# Patient Record
Sex: FEMALE | Race: WHITE | NOT HISPANIC OR LATINO | Employment: FULL TIME | URBAN - METROPOLITAN AREA
[De-identification: names, ages, dates, MRNs, and addresses within clinical notes are randomized per-mention and may not be internally consistent; named-entity substitution may affect disease eponyms.]

---

## 2023-04-07 DIAGNOSIS — G47.00 INSOMNIA, UNSPECIFIED TYPE: Primary | ICD-10-CM

## 2023-04-07 RX ORDER — HYDROXYZINE HYDROCHLORIDE 25 MG/1
TABLET, FILM COATED ORAL
Qty: 90 TABLET | Refills: 0 | Status: SHIPPED | OUTPATIENT
Start: 2023-04-07 | End: 2023-05-05 | Stop reason: SDUPTHER

## 2023-05-04 ASSESSMENT — ENCOUNTER SYMPTOMS
SHORTNESS OF BREATH: 0
ABDOMINAL PAIN: 0

## 2023-05-05 ENCOUNTER — OFFICE VISIT (OUTPATIENT)
Dept: PRIMARY CARE | Facility: CLINIC | Age: 43
End: 2023-05-05
Payer: COMMERCIAL

## 2023-05-05 VITALS
DIASTOLIC BLOOD PRESSURE: 82 MMHG | OXYGEN SATURATION: 98 % | TEMPERATURE: 97.1 F | SYSTOLIC BLOOD PRESSURE: 122 MMHG | BODY MASS INDEX: 27.14 KG/M2 | HEART RATE: 76 BPM | WEIGHT: 159 LBS | HEIGHT: 64 IN

## 2023-05-05 DIAGNOSIS — R61 GENERALIZED HYPERHIDROSIS: ICD-10-CM

## 2023-05-05 DIAGNOSIS — F32.0 MILD MAJOR DEPRESSION (CMS-HCC): ICD-10-CM

## 2023-05-05 DIAGNOSIS — F41.1 GAD (GENERALIZED ANXIETY DISORDER): Primary | ICD-10-CM

## 2023-05-05 DIAGNOSIS — K58.0 IRRITABLE BOWEL SYNDROME WITH DIARRHEA: ICD-10-CM

## 2023-05-05 DIAGNOSIS — G47.00 INSOMNIA, UNSPECIFIED TYPE: ICD-10-CM

## 2023-05-05 PROCEDURE — 99214 OFFICE O/P EST MOD 30 MIN: CPT | Performed by: PHYSICIAN ASSISTANT

## 2023-05-05 RX ORDER — ALUMINUM CHLORIDE 20 %
SOLUTION, NON-ORAL TOPICAL
COMMUNITY
Start: 2016-01-15 | End: 2023-05-05 | Stop reason: SDUPTHER

## 2023-05-05 RX ORDER — BUPROPION HYDROCHLORIDE 150 MG/1
150 TABLET ORAL EVERY MORNING
Qty: 90 TABLET | Refills: 1 | Status: SHIPPED | OUTPATIENT
Start: 2023-05-05 | End: 2023-10-05 | Stop reason: DRUGHIGH

## 2023-05-05 RX ORDER — LEVONORGESTREL 52 MG/1
INTRAUTERINE DEVICE INTRAUTERINE
COMMUNITY

## 2023-05-05 RX ORDER — HYDROXYZINE HYDROCHLORIDE 25 MG/1
TABLET, FILM COATED ORAL
Qty: 90 TABLET | Refills: 1 | Status: SHIPPED | OUTPATIENT
Start: 2023-05-05 | End: 2023-10-05 | Stop reason: SDUPTHER

## 2023-05-05 RX ORDER — DICYCLOMINE HYDROCHLORIDE 20 MG/1
TABLET ORAL
COMMUNITY
Start: 2021-11-08 | End: 2023-10-05 | Stop reason: SDUPTHER

## 2023-05-05 RX ORDER — CITALOPRAM 20 MG/1
20 TABLET, FILM COATED ORAL DAILY
Qty: 90 TABLET | Refills: 1 | Status: SHIPPED | OUTPATIENT
Start: 2023-05-05 | End: 2023-06-28 | Stop reason: SDUPTHER

## 2023-05-05 RX ORDER — HYDROXYCHLOROQUINE SULFATE 200 MG/1
TABLET, FILM COATED ORAL EVERY 24 HOURS
COMMUNITY

## 2023-05-05 RX ORDER — ALUMINUM CHLORIDE 20 %
SOLUTION, NON-ORAL TOPICAL
Qty: 37.5 ML | Refills: 1 | Status: SHIPPED | OUTPATIENT
Start: 2023-05-05 | End: 2023-10-05 | Stop reason: SDUPTHER

## 2023-05-05 RX ORDER — ALBUTEROL SULFATE 90 UG/1
AEROSOL, METERED RESPIRATORY (INHALATION)
COMMUNITY
Start: 2020-06-02 | End: 2023-10-05 | Stop reason: WASHOUT

## 2023-05-05 RX ORDER — CITALOPRAM 40 MG/1
TABLET, FILM COATED ORAL
COMMUNITY
Start: 2020-10-26 | End: 2023-05-05 | Stop reason: DRUGHIGH

## 2023-05-05 NOTE — PROGRESS NOTES
"Subjective   Patient ID: Tara L Berardinelli is a 42 y.o. female who presents for Anxiety, Depression, and Insomnia (Ibs recheck ).    HPI   Patient presents for recheck of anxiety/depression, IBS, and hyperhidrosis.  Anxiety and depression: Fairly well controlled with Citalopram 40mg, which she tolerates well. Mood seems ok. Mind racing controlled fairly well. No panic attacks. No suicidal ideation.   New job is going well.  However she is noticing that she is having some attention issues that are more noticeable in this job.  Has always struggled with attention deficit however was able to work through it however it seems like its been more noticeable over this past 6 months.  Hard time focusing and staying on task.  Gets distracted easily.  Others have mentioned to her that she seems to be having attention difficulties.  Sometimes lacking motivation.    Insomnia: Needing to use hydroxyzine 25mg qhs most nights to help sleep. It helps though.      Doing regular exercise but more with wt lifting than cardio.     IBS: Still doing well. Only needs to take Bentyl rarely. Using probiotics.   No abdominal pain.     Hyperhidrosis: Well controlled with Drysol that she uses rarely (about once per month). Tolerates med well.      Sees rheumatologist for chronic intermittent arthralgias and Sjogren's still. Continues to do well with plaquenil.      Got COVID vaccine.     Former smoker.     Review of Systems   Respiratory:  Negative for shortness of breath.    Cardiovascular:  Negative for chest pain.   Gastrointestinal:  Negative for abdominal pain.       Objective   /82   Pulse 76   Temp 36.2 °C (97.1 °F)   Ht 1.626 m (5' 4\")   Wt 72.1 kg (159 lb)   SpO2 98%   BMI 27.29 kg/m²     Physical Exam  Vitals and nursing note reviewed.   Constitutional:       Appearance: Normal appearance. She is well-developed.   Eyes:      General: No scleral icterus.  Cardiovascular:      Rate and Rhythm: Normal rate and regular " rhythm.      Heart sounds: No murmur heard.  Pulmonary:      Effort: Pulmonary effort is normal.      Breath sounds: Normal breath sounds.   Abdominal:      Palpations: Abdomen is soft. There is no mass.      Tenderness: There is no abdominal tenderness.   Musculoskeletal:      Cervical back: Neck supple.   Skin:     General: Skin is warm and dry.   Neurological:      Mental Status: She is alert.         Assessment/Plan   Diagnoses and all orders for this visit:  EVANGELINA (generalized anxiety disorder)  -     citalopram (CeleXA) 20 mg tablet; Take 1 tablet (20 mg) by mouth once daily.  -     buPROPion XL (Wellbutrin XL) 150 mg 24 hr tablet; Take 1 tablet (150 mg) by mouth once daily in the morning.  Mild major depression (CMS/HCC)  -     citalopram (CeleXA) 20 mg tablet; Take 1 tablet (20 mg) by mouth once daily.  -     buPROPion XL (Wellbutrin XL) 150 mg 24 hr tablet; Take 1 tablet (150 mg) by mouth once daily in the morning.  Insomnia, unspecified type  -     hydrOXYzine HCL (Atarax) 25 mg tablet; TAKE 1 TABLET BY MOUTH AT BEDTIME AS NEEDED  Irritable bowel syndrome with diarrhea  Generalized hyperhidrosis  -     aluminum chloride (Drysol Dab-O-Matic) 20 % external solution; apply 2x/wk at bedtime as needed       Counseled patient.  Discussed treatment options for attention difficulties, particularly nonstimulant routes.  Discussed studies with Wellbutrin by nursing good results and patient is interested in trying this.  Reduce citalopram to 20 mg daily and add Rx bupropion  mg 1 p.o. every morning.  Refilled meds.   Discussed diet and exercise and encouraged weight loss.  Add more cardio exercise.  Follow up in 5 months for recheck of anxiety, depression, insomnia, IBS, hyperhidrosis, earlier if needed.

## 2023-06-27 ENCOUNTER — TELEPHONE (OUTPATIENT)
Dept: PRIMARY CARE | Facility: CLINIC | Age: 43
End: 2023-06-27
Payer: COMMERCIAL

## 2023-06-27 DIAGNOSIS — F32.0 MILD MAJOR DEPRESSION (CMS-HCC): ICD-10-CM

## 2023-06-27 DIAGNOSIS — F41.1 GAD (GENERALIZED ANXIETY DISORDER): ICD-10-CM

## 2023-06-27 NOTE — TELEPHONE ENCOUNTER
Pt called rx line at 226p stating that the changes in meds at last ov on 5/2023 is not working, reducing citalopram and starting wellbutrin is not working. She feels short fused and more anxious. Please advise, OV??

## 2023-06-28 RX ORDER — CITALOPRAM 20 MG/1
30 TABLET, FILM COATED ORAL DAILY
Qty: 135 TABLET | Refills: 0 | Status: SHIPPED | OUTPATIENT
Start: 2023-06-28 | End: 2023-10-05 | Stop reason: SDUPTHER

## 2023-10-05 ENCOUNTER — OFFICE VISIT (OUTPATIENT)
Dept: PRIMARY CARE | Facility: CLINIC | Age: 43
End: 2023-10-05
Payer: COMMERCIAL

## 2023-10-05 VITALS
TEMPERATURE: 97.4 F | BODY MASS INDEX: 27.12 KG/M2 | HEART RATE: 68 BPM | WEIGHT: 158 LBS | DIASTOLIC BLOOD PRESSURE: 70 MMHG | SYSTOLIC BLOOD PRESSURE: 116 MMHG

## 2023-10-05 DIAGNOSIS — R61 GENERALIZED HYPERHIDROSIS: ICD-10-CM

## 2023-10-05 DIAGNOSIS — K58.0 IRRITABLE BOWEL SYNDROME WITH DIARRHEA: ICD-10-CM

## 2023-10-05 DIAGNOSIS — F41.1 GAD (GENERALIZED ANXIETY DISORDER): ICD-10-CM

## 2023-10-05 DIAGNOSIS — G47.00 INSOMNIA, UNSPECIFIED TYPE: ICD-10-CM

## 2023-10-05 DIAGNOSIS — F32.0 MILD MAJOR DEPRESSION (CMS-HCC): Primary | ICD-10-CM

## 2023-10-05 PROBLEM — F32.9 MAJOR DEPRESSIVE DISORDER: Status: ACTIVE | Noted: 2017-01-01

## 2023-10-05 PROBLEM — M35.00 SJOGRENS SYNDROME (MULTI): Status: ACTIVE | Noted: 2023-10-05

## 2023-10-05 PROCEDURE — 99214 OFFICE O/P EST MOD 30 MIN: CPT | Performed by: PHYSICIAN ASSISTANT

## 2023-10-05 PROCEDURE — 1036F TOBACCO NON-USER: CPT | Performed by: PHYSICIAN ASSISTANT

## 2023-10-05 RX ORDER — BUPROPION HYDROCHLORIDE 300 MG/1
300 TABLET ORAL EVERY MORNING
Qty: 90 TABLET | Refills: 1 | Status: SHIPPED | OUTPATIENT
Start: 2023-10-05 | End: 2024-02-27 | Stop reason: SDUPTHER

## 2023-10-05 RX ORDER — CITALOPRAM 20 MG/1
30 TABLET, FILM COATED ORAL DAILY
Qty: 135 TABLET | Refills: 0 | Status: SHIPPED | OUTPATIENT
Start: 2023-10-05 | End: 2024-01-17

## 2023-10-05 RX ORDER — DICYCLOMINE HYDROCHLORIDE 20 MG/1
20 TABLET ORAL 2 TIMES DAILY PRN
Qty: 60 TABLET | Refills: 0 | Status: SHIPPED | OUTPATIENT
Start: 2023-10-05 | End: 2024-02-27 | Stop reason: SDUPTHER

## 2023-10-05 RX ORDER — BUPROPION HYDROCHLORIDE 150 MG/1
150 TABLET ORAL EVERY MORNING
Qty: 90 TABLET | Refills: 1 | Status: CANCELLED | OUTPATIENT
Start: 2023-10-05

## 2023-10-05 RX ORDER — ALBUTEROL SULFATE 90 UG/1
AEROSOL, METERED RESPIRATORY (INHALATION)
Qty: 18 G | Status: CANCELLED | OUTPATIENT
Start: 2023-10-05

## 2023-10-05 RX ORDER — HYDROXYCHLOROQUINE SULFATE 200 MG/1
TABLET, FILM COATED ORAL EVERY 24 HOURS
Status: CANCELLED | OUTPATIENT
Start: 2023-10-05

## 2023-10-05 RX ORDER — ALUMINUM CHLORIDE 20 %
SOLUTION, NON-ORAL TOPICAL
Qty: 37.5 ML | Refills: 1 | Status: SHIPPED | OUTPATIENT
Start: 2023-10-05

## 2023-10-05 RX ORDER — HYDROXYZINE HYDROCHLORIDE 25 MG/1
TABLET, FILM COATED ORAL
Qty: 90 TABLET | Refills: 1 | Status: SHIPPED | OUTPATIENT
Start: 2023-10-05 | End: 2024-02-27 | Stop reason: SDUPTHER

## 2023-10-05 RX ORDER — OXYBUTYNIN CHLORIDE 10 MG/1
10 TABLET, EXTENDED RELEASE ORAL DAILY
COMMUNITY
Start: 2023-08-18 | End: 2023-10-05 | Stop reason: WASHOUT

## 2023-10-05 ASSESSMENT — ENCOUNTER SYMPTOMS
SHORTNESS OF BREATH: 0
ABDOMINAL PAIN: 0

## 2023-10-05 NOTE — PROGRESS NOTES
Subjective   Patient ID: Tara L Berardinelli is a 42 y.o. female who presents for Anxiety, Depression, and Insomnia.    HPI   Patient presents for recheck of anxiety/depression, IBS, and hyperhidrosis.    Anxiety and depression: Reduced citalopram to 20mg after last visit and added Wellbutrin XL 150mg qAM to see if it would help attention issues and motivation. Felt more anxious and irritable, so increased citalopram to 30mg instead.  Still getting a lot of anxiety and still a little down and difficulty focusing. Interested in increasing Wellbutrin dose.     Insomnia: Needing to use hydroxyzine 25mg qhs most nights to help sleep.      Doing regular exercise--getting more cardio, doing Sony.     IBS: Still doing well. Only needs to take Bentyl rarely. Using probiotics.  No abdominal pain.     Hyperhidrosis: Well controlled with Drysol that she uses rarely (about once per month). Tolerates med well.      Sees rheumatologist for chronic intermittent arthralgias and Sjogren's. Continues to do well with Plaquenil.       reports that she quit smoking about 20 years ago. Her smoking use included cigarettes. She smoked an average of .25 packs per day. She has never used smokeless tobacco.    Review of Systems   Respiratory:  Negative for shortness of breath.    Cardiovascular:  Negative for chest pain.   Gastrointestinal:  Negative for abdominal pain.       Objective   /70   Pulse 68   Temp 36.3 °C (97.4 °F)   Wt 71.7 kg (158 lb)   BMI 27.12 kg/m²     Physical Exam  Vitals and nursing note reviewed.   Constitutional:       Appearance: Normal appearance. She is well-developed.   Eyes:      General: No scleral icterus.  Cardiovascular:      Rate and Rhythm: Normal rate and regular rhythm.      Heart sounds: No murmur heard.  Pulmonary:      Effort: Pulmonary effort is normal.      Breath sounds: Normal breath sounds.   Abdominal:      Palpations: Abdomen is soft. There is no mass.      Tenderness: There is no  abdominal tenderness.   Musculoskeletal:      Cervical back: Neck supple.   Skin:     General: Skin is warm and dry.   Neurological:      Mental Status: She is alert.       Assessment/Plan   Diagnoses and all orders for this visit:  Mild major depression (CMS/HCC)  -     citalopram (CeleXA) 20 mg tablet; Take 1.5 tablets (30 mg) by mouth once daily.  -     buPROPion XL (Wellbutrin XL) 300 mg 24 hr tablet; Take 1 tablet (300 mg) by mouth once daily in the morning. Do not crush, chew, or split.  EVANGELINA (generalized anxiety disorder)  -     citalopram (CeleXA) 20 mg tablet; Take 1.5 tablets (30 mg) by mouth once daily.  -     buPROPion XL (Wellbutrin XL) 300 mg 24 hr tablet; Take 1 tablet (300 mg) by mouth once daily in the morning. Do not crush, chew, or split.  Insomnia, unspecified type  -     hydrOXYzine HCL (Atarax) 25 mg tablet; TAKE 1 TABLET BY MOUTH AT BEDTIME AS NEEDED  Irritable bowel syndrome with diarrhea  -     dicyclomine (Bentyl) 20 mg tablet; Take 1 tablet (20 mg) by mouth 2 times a day as needed (IBS symptoms).  Generalized hyperhidrosis  -     aluminum chloride (Drysol Dab-O-Matic) 20 % external solution; apply 2x/wk at bedtime as needed       Counseled patient.    Increase to rx Wellbutrin XL 300mg every day.   Continue Citalopram 20mg 1 1/2 pill per day.   Refilled meds.   Discussed diet and exercise and encouraged weight loss.    Follow up in 5 months for recheck of anxiety, depression, insomnia, IBS, hyperhidrosis, earlier if needed.

## 2024-01-17 DIAGNOSIS — F41.1 GAD (GENERALIZED ANXIETY DISORDER): ICD-10-CM

## 2024-01-17 DIAGNOSIS — F32.0 MILD MAJOR DEPRESSION (CMS-HCC): ICD-10-CM

## 2024-01-17 RX ORDER — CITALOPRAM 20 MG/1
30 TABLET, FILM COATED ORAL DAILY
Qty: 135 TABLET | Refills: 0 | Status: SHIPPED | OUTPATIENT
Start: 2024-01-17 | End: 2024-02-27 | Stop reason: SDUPTHER

## 2024-02-27 ENCOUNTER — OFFICE VISIT (OUTPATIENT)
Dept: PRIMARY CARE | Facility: CLINIC | Age: 44
End: 2024-02-27
Payer: COMMERCIAL

## 2024-02-27 VITALS
WEIGHT: 153 LBS | DIASTOLIC BLOOD PRESSURE: 80 MMHG | HEART RATE: 64 BPM | TEMPERATURE: 97.7 F | SYSTOLIC BLOOD PRESSURE: 122 MMHG | BODY MASS INDEX: 26.26 KG/M2

## 2024-02-27 DIAGNOSIS — F41.1 GAD (GENERALIZED ANXIETY DISORDER): ICD-10-CM

## 2024-02-27 DIAGNOSIS — Z00.00 ROUTINE GENERAL MEDICAL EXAMINATION AT A HEALTH CARE FACILITY: ICD-10-CM

## 2024-02-27 DIAGNOSIS — F32.0 MILD MAJOR DEPRESSION (CMS-HCC): Primary | ICD-10-CM

## 2024-02-27 DIAGNOSIS — K58.0 IRRITABLE BOWEL SYNDROME WITH DIARRHEA: ICD-10-CM

## 2024-02-27 DIAGNOSIS — G47.00 INSOMNIA, UNSPECIFIED TYPE: ICD-10-CM

## 2024-02-27 DIAGNOSIS — R61 GENERALIZED HYPERHIDROSIS: ICD-10-CM

## 2024-02-27 PROCEDURE — 99214 OFFICE O/P EST MOD 30 MIN: CPT | Performed by: PHYSICIAN ASSISTANT

## 2024-02-27 PROCEDURE — 1036F TOBACCO NON-USER: CPT | Performed by: PHYSICIAN ASSISTANT

## 2024-02-27 RX ORDER — CITALOPRAM 20 MG/1
30 TABLET, FILM COATED ORAL DAILY
Qty: 135 TABLET | Refills: 0 | Status: SHIPPED | OUTPATIENT
Start: 2024-02-27 | End: 2024-02-28 | Stop reason: SDUPTHER

## 2024-02-27 RX ORDER — DICYCLOMINE HYDROCHLORIDE 20 MG/1
20 TABLET ORAL 2 TIMES DAILY PRN
Qty: 60 TABLET | Refills: 0 | Status: SHIPPED | OUTPATIENT
Start: 2024-02-27 | End: 2024-02-28 | Stop reason: SDUPTHER

## 2024-02-27 RX ORDER — BUPROPION HYDROCHLORIDE 300 MG/1
300 TABLET ORAL EVERY MORNING
Qty: 90 TABLET | Refills: 1 | Status: SHIPPED | OUTPATIENT
Start: 2024-02-27 | End: 2024-08-25

## 2024-02-27 RX ORDER — HYDROXYZINE HYDROCHLORIDE 25 MG/1
TABLET, FILM COATED ORAL
Qty: 90 TABLET | Refills: 1 | Status: SHIPPED | OUTPATIENT
Start: 2024-02-27 | End: 2024-02-28 | Stop reason: SDUPTHER

## 2024-02-27 ASSESSMENT — ENCOUNTER SYMPTOMS
ABDOMINAL PAIN: 0
SHORTNESS OF BREATH: 0

## 2024-02-27 NOTE — PROGRESS NOTES
Subjective   Patient ID: Tara L Berardinelli is a 43 y.o. female who presents for Anxiety.    HPI   Patient presents for recheck of anxiety/depression, IBS, and hyperhidrosis.    Anxiety and depression: Taking and tolerating Wellbutrin XL 300mg and Citalopram 30mg every day. It is helping anxiety and irritability but also helping her focus and helping mood.     Insomnia: Needing to use hydroxyzine 25mg qhs most nights to help sleep.      Doing regular exercise--getting cardio, doing Sony 3-5 days per week.     IBS: Still doing fairly well. Only needs to take Bentyl rarely.  No abdominal pain.     Hyperhidrosis: Well controlled with Drysol that she uses rarely (about once per month). Tolerates med well.      Sees rheumatologist Dr Swenson for chronic intermittent arthralgias and Sjogren's. Continues to do well with Plaquenil.  Was there 1/3/24.  No changes.     reports that she quit smoking about 21 years ago. Her smoking use included cigarettes. She smoked an average of .25 packs per day. She has never used smokeless tobacco.    Review of Systems   Respiratory:  Negative for shortness of breath.    Cardiovascular:  Negative for chest pain.   Gastrointestinal:  Negative for abdominal pain.       Objective   /80   Pulse 64   Temp 36.5 °C (97.7 °F)   Wt 69.4 kg (153 lb)   BMI 26.26 kg/m²     Physical Exam  Vitals and nursing note reviewed.   Constitutional:       Appearance: Normal appearance. She is well-developed.   Eyes:      General: No scleral icterus.  Cardiovascular:      Rate and Rhythm: Normal rate and regular rhythm.      Heart sounds: No murmur heard.  Pulmonary:      Effort: Pulmonary effort is normal.      Breath sounds: Normal breath sounds.   Abdominal:      Palpations: Abdomen is soft. There is no mass.      Tenderness: There is no abdominal tenderness.   Musculoskeletal:      Cervical back: Neck supple.   Skin:     General: Skin is warm and dry.   Neurological:      Mental Status: She is  alert.       Assessment/Plan   Diagnoses and all orders for this visit:  Mild major depression (CMS/HCC)  -     buPROPion XL (Wellbutrin XL) 300 mg 24 hr tablet; Take 1 tablet (300 mg) by mouth once daily in the morning. Do not crush, chew, or split.  -     citalopram (CeleXA) 20 mg tablet; Take 1.5 tablets (30 mg) by mouth once daily.  EVANGELINA (generalized anxiety disorder)  -     buPROPion XL (Wellbutrin XL) 300 mg 24 hr tablet; Take 1 tablet (300 mg) by mouth once daily in the morning. Do not crush, chew, or split.  -     citalopram (CeleXA) 20 mg tablet; Take 1.5 tablets (30 mg) by mouth once daily.  Insomnia, unspecified type  -     hydrOXYzine HCL (Atarax) 25 mg tablet; TAKE 1 TABLET BY MOUTH AT BEDTIME AS NEEDED  Irritable bowel syndrome with diarrhea  -     dicyclomine (Bentyl) 20 mg tablet; Take 1 tablet (20 mg) by mouth 2 times a day as needed (IBS symptoms).  Generalized hyperhidrosis  Routine general medical examination at a health care facility  -     Lipid Panel; Future  -     Basic Metabolic Panel; Future       Counseled patient.    Refilled meds.   Discussed diet and exercise and encouraged weight loss.    Follow up in 5 months for recheck of anxiety, depression, insomnia, IBS, hyperhidrosis, with fasting labs the week before.

## 2024-02-28 DIAGNOSIS — F41.1 GAD (GENERALIZED ANXIETY DISORDER): ICD-10-CM

## 2024-02-28 DIAGNOSIS — F32.0 MILD MAJOR DEPRESSION (CMS-HCC): ICD-10-CM

## 2024-02-28 DIAGNOSIS — K58.0 IRRITABLE BOWEL SYNDROME WITH DIARRHEA: ICD-10-CM

## 2024-02-28 DIAGNOSIS — G47.00 INSOMNIA, UNSPECIFIED TYPE: ICD-10-CM

## 2024-02-28 RX ORDER — DICYCLOMINE HYDROCHLORIDE 20 MG/1
20 TABLET ORAL 2 TIMES DAILY PRN
Qty: 60 TABLET | Refills: 0 | Status: SHIPPED | OUTPATIENT
Start: 2024-02-28

## 2024-02-28 RX ORDER — HYDROXYZINE HYDROCHLORIDE 25 MG/1
TABLET, FILM COATED ORAL
Qty: 90 TABLET | Refills: 1 | Status: SHIPPED | OUTPATIENT
Start: 2024-02-28

## 2024-02-28 RX ORDER — HYDROXYZINE HYDROCHLORIDE 25 MG/1
TABLET, FILM COATED ORAL
Qty: 90 TABLET | Refills: 1 | Status: SHIPPED | OUTPATIENT
Start: 2024-02-28 | End: 2024-02-28 | Stop reason: SDUPTHER

## 2024-02-28 RX ORDER — CITALOPRAM 20 MG/1
30 TABLET, FILM COATED ORAL DAILY
Qty: 135 TABLET | Refills: 1 | Status: SHIPPED | OUTPATIENT
Start: 2024-02-28 | End: 2024-02-28 | Stop reason: SDUPTHER

## 2024-02-28 RX ORDER — CITALOPRAM 20 MG/1
30 TABLET, FILM COATED ORAL DAILY
Qty: 135 TABLET | Refills: 1 | Status: SHIPPED | OUTPATIENT
Start: 2024-02-28

## 2024-06-26 DIAGNOSIS — R61 GENERALIZED HYPERHIDROSIS: ICD-10-CM

## 2024-06-27 RX ORDER — ALUMINUM CHLORIDE 20 %
SOLUTION, NON-ORAL TOPICAL
Qty: 37.5 ML | Refills: 0 | OUTPATIENT
Start: 2024-06-27

## 2024-07-01 DIAGNOSIS — R61 GENERALIZED HYPERHIDROSIS: ICD-10-CM

## 2024-07-08 RX ORDER — ALUMINUM CHLORIDE 20 %
SOLUTION, NON-ORAL TOPICAL
Qty: 37.5 ML | Refills: 1 | Status: SHIPPED | OUTPATIENT
Start: 2024-07-08

## 2024-07-30 ENCOUNTER — APPOINTMENT (OUTPATIENT)
Dept: PRIMARY CARE | Facility: CLINIC | Age: 44
End: 2024-07-30
Payer: COMMERCIAL

## 2024-07-30 VITALS
TEMPERATURE: 97.6 F | BODY MASS INDEX: 26.95 KG/M2 | SYSTOLIC BLOOD PRESSURE: 122 MMHG | HEART RATE: 87 BPM | OXYGEN SATURATION: 100 % | DIASTOLIC BLOOD PRESSURE: 80 MMHG | WEIGHT: 157 LBS

## 2024-07-30 DIAGNOSIS — G47.00 INSOMNIA, UNSPECIFIED TYPE: ICD-10-CM

## 2024-07-30 DIAGNOSIS — R61 GENERALIZED HYPERHIDROSIS: ICD-10-CM

## 2024-07-30 DIAGNOSIS — F32.0 MILD MAJOR DEPRESSION (CMS-HCC): Primary | ICD-10-CM

## 2024-07-30 DIAGNOSIS — K43.9 VENTRAL HERNIA WITHOUT OBSTRUCTION OR GANGRENE: ICD-10-CM

## 2024-07-30 DIAGNOSIS — F41.1 GAD (GENERALIZED ANXIETY DISORDER): ICD-10-CM

## 2024-07-30 DIAGNOSIS — K58.0 IRRITABLE BOWEL SYNDROME WITH DIARRHEA: ICD-10-CM

## 2024-07-30 PROCEDURE — 99214 OFFICE O/P EST MOD 30 MIN: CPT | Performed by: PHYSICIAN ASSISTANT

## 2024-07-30 RX ORDER — HYDROXYZINE HYDROCHLORIDE 25 MG/1
TABLET, FILM COATED ORAL
Qty: 90 TABLET | Refills: 1 | Status: SHIPPED | OUTPATIENT
Start: 2024-07-30

## 2024-07-30 RX ORDER — BUPROPION HYDROCHLORIDE 300 MG/1
300 TABLET ORAL EVERY MORNING
Qty: 90 TABLET | Refills: 1 | Status: SHIPPED | OUTPATIENT
Start: 2024-07-30 | End: 2025-01-26

## 2024-07-30 RX ORDER — CITALOPRAM 20 MG/1
30 TABLET, FILM COATED ORAL DAILY
Qty: 135 TABLET | Refills: 1 | Status: SHIPPED | OUTPATIENT
Start: 2024-07-30

## 2024-07-30 ASSESSMENT — ENCOUNTER SYMPTOMS
SHORTNESS OF BREATH: 0
ABDOMINAL PAIN: 0

## 2024-07-30 ASSESSMENT — PATIENT HEALTH QUESTIONNAIRE - PHQ9
2. FEELING DOWN, DEPRESSED OR HOPELESS: NOT AT ALL
SUM OF ALL RESPONSES TO PHQ9 QUESTIONS 1 AND 2: 0
1. LITTLE INTEREST OR PLEASURE IN DOING THINGS: NOT AT ALL

## 2024-07-30 NOTE — PROGRESS NOTES
Subjective   Patient ID: Tara L Berardinelli is a 43 y.o. female who presents for Anxiety and Depression.    HPI   Patient presents for recheck of anxiety/depression, IBS, and hyperhidrosis.    Anxiety and depression: Taking and tolerating Wellbutrin XL 300mg and Citalopram 30mg every day. Controlling anxiety and mood is good.      Insomnia: Needing to use hydroxyzine 25mg qhs most nights to help sleep.      Doing regular exercise--doing walking exercise -- not been doing as much Sony lately.   Getting left abdominal pain in area of her hernia scar the past few months. Started after doing wt lifting again.  Hurts when she lifts something heavy.  Had hernia repair in 2018 with Dr Baltazar.     IBS: Still doing fairly well. Only needs to take Bentyl rarely.  No abdominal pain.     Hyperhidrosis: Well controlled with Drysol that she uses rarely (about once per month). Tolerates med well.      Sees rheumatologist Dr Swenson for chronic intermittent arthralgias and Sjogren's. Continues to do well with Plaquenil.  Was there 1/3/24.  No changes.    Did not get fasting labs done for this visit.     reports that she quit smoking about 21 years ago. Her smoking use included cigarettes. She has never used smokeless tobacco.    Review of Systems   Respiratory:  Negative for shortness of breath.    Cardiovascular:  Negative for chest pain.   Gastrointestinal:  Negative for abdominal pain.       Objective   /80   Pulse 87   Temp 36.4 °C (97.6 °F)   Wt 71.2 kg (157 lb)   SpO2 100%   BMI 26.95 kg/m²     Physical Exam  Vitals and nursing note reviewed.   Constitutional:       Appearance: Normal appearance. She is well-developed.   Eyes:      General: No scleral icterus.  Cardiovascular:      Rate and Rhythm: Normal rate and regular rhythm.      Heart sounds: No murmur heard.  Pulmonary:      Effort: Pulmonary effort is normal.      Breath sounds: Normal breath sounds.   Abdominal:      Palpations: Abdomen is soft. There is  no mass.      Comments: There is some tenderness superior to umbilicus and slightly to the left of the midline. This is area is just medial to the small scar from her previous hernia surgery.  Feels like there is some weakness of muscle wall in this area with possible hernia. Has some mild protrusion with valsalva.    Musculoskeletal:      Cervical back: Neck supple.   Skin:     General: Skin is warm and dry.   Neurological:      Mental Status: She is alert.       Assessment/Plan   Diagnoses and all orders for this visit:  Mild major depression (CMS-HCC)  -     buPROPion XL (Wellbutrin XL) 300 mg 24 hr tablet; Take 1 tablet (300 mg) by mouth once daily in the morning. Do not crush, chew, or split.  -     citalopram (CeleXA) 20 mg tablet; Take 1.5 tablets (30 mg) by mouth once daily.  EVANGELINA (generalized anxiety disorder)  -     buPROPion XL (Wellbutrin XL) 300 mg 24 hr tablet; Take 1 tablet (300 mg) by mouth once daily in the morning. Do not crush, chew, or split.  -     citalopram (CeleXA) 20 mg tablet; Take 1.5 tablets (30 mg) by mouth once daily.  Insomnia, unspecified type  -     hydrOXYzine HCL (Atarax) 25 mg tablet; TAKE 1 TABLET BY MOUTH AT BEDTIME AS NEEDED  Irritable bowel syndrome with diarrhea  Generalized hyperhidrosis  Ventral hernia without obstruction or gangrene  -     Referral to General Surgery; Future         Counseled patient.    Refilled meds.   Referred to general surgeon for opinion regarding possible hernia.   Discussed diet and exercise and encouraged weight loss.    Follow up in 5 months for recheck of anxiety, depression, insomnia, IBS, hyperhidrosis, with fasting labs the week before.

## 2024-08-14 ENCOUNTER — APPOINTMENT (OUTPATIENT)
Dept: OBSTETRICS AND GYNECOLOGY | Facility: CLINIC | Age: 44
End: 2024-08-14
Payer: COMMERCIAL

## 2024-08-15 ENCOUNTER — APPOINTMENT (OUTPATIENT)
Dept: SURGERY | Facility: CLINIC | Age: 44
End: 2024-08-15
Payer: COMMERCIAL

## 2024-08-15 VITALS
WEIGHT: 157 LBS | HEART RATE: 83 BPM | SYSTOLIC BLOOD PRESSURE: 112 MMHG | DIASTOLIC BLOOD PRESSURE: 76 MMHG | BODY MASS INDEX: 26.95 KG/M2

## 2024-08-15 DIAGNOSIS — K43.9 VENTRAL HERNIA WITHOUT OBSTRUCTION OR GANGRENE: ICD-10-CM

## 2024-08-15 PROCEDURE — 99203 OFFICE O/P NEW LOW 30 MIN: CPT | Performed by: SURGERY

## 2024-08-15 PROCEDURE — 1036F TOBACCO NON-USER: CPT | Performed by: SURGERY

## 2024-08-15 NOTE — PROGRESS NOTES
History Of Present Illness  Tara L Berardinelli is a 43 y.o. female presenting with possible recurrent incisional hernia.  Patient was doing aggressive Sony class when she had sudden pain on the left upper quadrant by her Heck trocar site.  She had a previous laparoscopic epigastric hernia repair.  This was done in 2018.  I was able to review that operative note.  It was a laparoscopic repair.  They used a Ventralex balloon system.  Is fixated with both fascial sutures along with tacks.  I am unsure what type of tacks were used.  I have no imaging since then.  I was able to review the preoperative imaging it looks like an epigastric hernia with a small defect.  During the operative note they said there was 5 small different defects.  Patient had a lot of pain afterwards was admitted for pain.  She still feeling some latest discomfort in her abdominal wall since the sudden pain.  She has not really felt a bulge just kind of this pressure and pain.        Last Recorded Vitals  Blood pressure 112/76, pulse 83, weight 71.2 kg (157 lb).  Physical Examination  On examination I can see the stay suture spots from the previous hernia.  A little defect by where the Heck was in the left upper quadrant.  I do not really truly feel a true recurrent incisional hernia.      Relevant Results I reviewed with her preoperative CT scan that showed the small epigastric hernia along with the remainder of her core muscles .  I outlined her rectus internal/external oblique and transversalis.      Assessment/Plan patient with abdominal pain and thinks more of a core muscle injury from where the transfascial sutures were from her previous operation.  I will obtain a CT scan of her abdomen pelvis to assess that mesh placement and her abdominal muscles.  Once we assess that we will have her on a core muscle program to build up those muscles.  She agrees with this plan.    Drew Collado MD FACS  Professor of Surgery  [place] and  Jai Evans Chair in Surgical [place]  [facility] [employer]  [address] [facility], [address]  Phone [phone]  email: [email or web address]

## 2024-08-15 NOTE — LETTER
August 15, 2024     Steven Brown PA-C  [id number]  [address] [address]    Patient: Tara L Berardinelli   YOB: 1980   Date of Visit: 8/15/2024       Dear Dr. Steven Brown PA-C:    Thank you for referring Tara Berardinelli to me for evaluation. Below are my notes for this consultation.  If you have questions, please do not hesitate to call me. I look forward to following your patient along with you.       Sincerely,     Drew Collado MD      CC: No Recipients  ______________________________________________________________________________________    History Of Present Illness  Tara L Berardinelli is a 43 y.o. female presenting with possible recurrent incisional hernia.  Patient was doing aggressive Sony class when she had sudden pain on the left upper quadrant by her Heck trocar site.  She had a previous laparoscopic epigastric hernia repair.  This was done in 2018.  I was able to review that operative note.  It was a laparoscopic repair.  They used a Ventralex balloon system.  Is fixated with both fascial sutures along with tacks.  I am unsure what type of tacks were used.  I have no imaging since then.  I was able to review the preoperative imaging it looks like an epigastric hernia with a small defect.  During the operative note they said there was 5 small different defects.  Patient had a lot of pain afterwards was admitted for pain.  She still feeling some latest discomfort in her abdominal wall since the sudden pain.  She has not really felt a bulge just kind of this pressure and pain.        Last Recorded Vitals  Blood pressure 112/76, pulse 83, weight 71.2 kg (157 lb).  Physical Examination  On examination I can see the stay suture spots from the previous hernia.  A little defect by where the Heck was in the left upper quadrant.  I do not really truly feel a true recurrent incisional hernia.      Relevant Results I reviewed with her preoperative CT scan that  showed the small epigastric hernia along with the remainder of her core muscles .  I outlined her rectus internal/external oblique and transversalis.      Assessment/Planpatient with abdominal pain and thinks more of a core muscle injury from where the transfascial sutures were from her previous operation.  I will obtain a CT scan of her abdomen pelvis to assess that mesh placement and her abdominal muscles.  Once we assess that we will have her on a core muscle program to build up those muscles.  She agrees with this plan.    Drew Collado MD FACS  Professor of Surgery  Hayden Evans Chair in Surgical [place]  [facility] [employer]  [address] [place] [address], [address]  Phone [phone]  email: [email or web address]

## 2024-08-21 ENCOUNTER — APPOINTMENT (OUTPATIENT)
Dept: OBSTETRICS AND GYNECOLOGY | Facility: CLINIC | Age: 44
End: 2024-08-21
Payer: COMMERCIAL

## 2024-09-03 ENCOUNTER — LAB (OUTPATIENT)
Dept: LAB | Facility: LAB | Age: 44
End: 2024-09-03
Payer: COMMERCIAL

## 2024-09-03 DIAGNOSIS — Z00.00 ROUTINE GENERAL MEDICAL EXAMINATION AT A HEALTH CARE FACILITY: ICD-10-CM

## 2024-09-03 LAB
ANION GAP SERPL CALC-SCNC: 13 MMOL/L (ref 10–20)
BUN SERPL-MCNC: 11 MG/DL (ref 6–23)
CALCIUM SERPL-MCNC: 8.5 MG/DL (ref 8.6–10.3)
CHLORIDE SERPL-SCNC: 105 MMOL/L (ref 98–107)
CHOLEST SERPL-MCNC: 127 MG/DL (ref 0–199)
CHOLESTEROL/HDL RATIO: 2.9
CO2 SERPL-SCNC: 24 MMOL/L (ref 21–32)
CREAT SERPL-MCNC: 0.86 MG/DL (ref 0.5–1.05)
EGFRCR SERPLBLD CKD-EPI 2021: 87 ML/MIN/1.73M*2
GLUCOSE SERPL-MCNC: 88 MG/DL (ref 74–99)
HDLC SERPL-MCNC: 44.4 MG/DL
LDLC SERPL CALC-MCNC: 54 MG/DL
NON HDL CHOLESTEROL: 83 MG/DL (ref 0–149)
POTASSIUM SERPL-SCNC: 4 MMOL/L (ref 3.5–5.3)
SODIUM SERPL-SCNC: 138 MMOL/L (ref 136–145)
TRIGL SERPL-MCNC: 141 MG/DL (ref 0–149)
VLDL: 28 MG/DL (ref 0–40)

## 2024-09-03 PROCEDURE — 80048 BASIC METABOLIC PNL TOTAL CA: CPT

## 2024-09-03 PROCEDURE — 36415 COLL VENOUS BLD VENIPUNCTURE: CPT

## 2024-09-03 PROCEDURE — 80061 LIPID PANEL: CPT

## 2024-09-10 ENCOUNTER — LAB (OUTPATIENT)
Dept: LAB | Facility: LAB | Age: 44
End: 2024-09-10
Payer: COMMERCIAL

## 2024-09-10 ENCOUNTER — APPOINTMENT (OUTPATIENT)
Dept: OBSTETRICS AND GYNECOLOGY | Facility: CLINIC | Age: 44
End: 2024-09-10
Payer: COMMERCIAL

## 2024-09-10 VITALS
WEIGHT: 155.8 LBS | DIASTOLIC BLOOD PRESSURE: 80 MMHG | BODY MASS INDEX: 26.6 KG/M2 | HEIGHT: 64 IN | SYSTOLIC BLOOD PRESSURE: 112 MMHG

## 2024-09-10 DIAGNOSIS — R23.2 HOT FLASHES: ICD-10-CM

## 2024-09-10 DIAGNOSIS — R23.2 HOT FLASHES: Primary | ICD-10-CM

## 2024-09-10 DIAGNOSIS — Z01.419 WELL WOMAN EXAM: ICD-10-CM

## 2024-09-10 DIAGNOSIS — Z12.31 ENCOUNTER FOR SCREENING MAMMOGRAM FOR MALIGNANT NEOPLASM OF BREAST: ICD-10-CM

## 2024-09-10 LAB — TSH SERPL-ACNC: 1.23 MIU/L (ref 0.44–3.98)

## 2024-09-10 PROCEDURE — 1036F TOBACCO NON-USER: CPT | Performed by: STUDENT IN AN ORGANIZED HEALTH CARE EDUCATION/TRAINING PROGRAM

## 2024-09-10 PROCEDURE — 36415 COLL VENOUS BLD VENIPUNCTURE: CPT

## 2024-09-10 PROCEDURE — 99396 PREV VISIT EST AGE 40-64: CPT | Performed by: STUDENT IN AN ORGANIZED HEALTH CARE EDUCATION/TRAINING PROGRAM

## 2024-09-10 PROCEDURE — 82670 ASSAY OF TOTAL ESTRADIOL: CPT

## 2024-09-10 PROCEDURE — 84443 ASSAY THYROID STIM HORMONE: CPT

## 2024-09-10 PROCEDURE — 83001 ASSAY OF GONADOTROPIN (FSH): CPT

## 2024-09-10 PROCEDURE — 3008F BODY MASS INDEX DOCD: CPT | Performed by: STUDENT IN AN ORGANIZED HEALTH CARE EDUCATION/TRAINING PROGRAM

## 2024-09-10 NOTE — PROGRESS NOTES
Subjective   Patient ID: Tara L Berardinelli is a 43 y.o. year old female patient presenting for   Chief Complaint   Patient presents with    est patient well woman    Annual Exam     Concerned with weight gain   .  Patient presents for well woman exam.  Sexual History: No pain with intercourse. She has had decreased libido for the past 2-3 years.  Current Contraception: Mirena IUD, placed in .  Menstrual History: She has a Mirena. She has occasional spotting. She did have some cramps with   Pregnancy History: She has two kids by . 11 and 21.   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    Patient is working on weight loss. She has improved her diet and is starting to exercise. She used to do burn bootcamp-this entailed pull-ups as well cardio.           Review of Systems   All other systems reviewed and are negative.        Past Medical History:   Diagnosis Date    Personal history of other diseases of the circulatory system     History of Raynaud's syndrome    Personal history of other diseases of the digestive system     History of irritable bowel syndrome    Personal history of other mental and behavioral disorders 2013    History of anxiety disorder    Sjogren syndrome, unspecified (Multi)     History of Sjogren's disease       Past Surgical History:   Procedure Laterality Date    OTHER SURGICAL HISTORY  2019    Hernia repair    OTHER SURGICAL HISTORY  2019    Tonsillectomy       Social History     Socioeconomic History    Marital status:      Spouse name: Not on file    Number of children: Not on file    Years of education: Not on file    Highest education level: Not on file   Occupational History    Not on file   Tobacco Use    Smoking status: Former     Current packs/day: 0.00     Types: Cigarettes     Quit date:      Years since quittin.7    Smokeless tobacco: Never   Vaping Use    Vaping status: Never Used   Substance and Sexual  Activity    Alcohol use: Yes     Alcohol/week: 2.0 standard drinks of alcohol     Types: 2 Standard drinks or equivalent per week    Drug use: Never    Sexual activity: Not on file   Other Topics Concern    Not on file   Social History Narrative    Not on file     Social Determinants of Health     Financial Resource Strain: Not on file   Food Insecurity: Not on file   Transportation Needs: Not on file   Physical Activity: Not on file   Stress: Not on file   Social Connections: Not on file   Intimate Partner Violence: Not on file   Housing Stability: Not on file           Objective   Physical Exam  General: A&Ox3  Head: Normocephalic, atraumatic  Heart/Lungs: Even chest rise, no increased work of breathing.  Breast: Normal in appearance bilaterally. No skin changes. No rashes or bruises. No palpable masses.  Abdomen: Soft, nontender. BS+4. No bruising or masses.  Genitourinary: Labia and vagina normal in appearance. Uterus is small, mobile, anteverted. No adnexal masses palpated.  Lower Extremities: No lower extremity Edema no palpable cords.     Assessment/Plan   Problem List Items Addressed This Visit       Well woman exam    Overview     Patient is doing well. We discussed weight bearing exercises as well as cardio.   Up to date on Pap. Next due 2027.  Mammogram ordered. Patient will be having breast surgery soon, recommend obtaining after full recovery.  Will obtain FSH and estradiol for joint pain, weight gain. Follow up with results.           Other Visit Diagnoses       Hot flashes    -  Primary    Relevant Orders    Follicle Stimulating Hormone    Estradiol    TSH with reflex to Free T4 if abnormal    Encounter for screening mammogram for malignant neoplasm of breast        Relevant Orders    BI mammo bilateral screening tomosynthesis               Landry Obsorne MD 04/20/24 3:41 PM

## 2024-09-11 LAB
ESTRADIOL SERPL-MCNC: 155 PG/ML
FSH SERPL-ACNC: 9.8 IU/L

## 2024-09-19 ENCOUNTER — DOCUMENTATION (OUTPATIENT)
Dept: SURGERY | Facility: HOSPITAL | Age: 44
End: 2024-09-19
Payer: COMMERCIAL

## 2024-09-19 NOTE — PROGRESS NOTES
I was able to review her CT scan that she had done.  I saw no evidence of any recurrent hernia.  I could see the mesh and the metal tacks was used to fixate the mesh.  She has had some resolution of her pain.  She is back to the gym.  She will follow-up me as needed.  She can continue to work on her core muscle exercises.

## 2024-10-09 DIAGNOSIS — K58.0 IRRITABLE BOWEL SYNDROME WITH DIARRHEA: ICD-10-CM

## 2024-10-14 RX ORDER — DICYCLOMINE HYDROCHLORIDE 20 MG/1
TABLET ORAL
Qty: 60 TABLET | Refills: 0 | OUTPATIENT
Start: 2024-10-14

## 2024-10-15 DIAGNOSIS — K58.0 IRRITABLE BOWEL SYNDROME WITH DIARRHEA: ICD-10-CM

## 2024-10-15 RX ORDER — DICYCLOMINE HYDROCHLORIDE 20 MG/1
20 TABLET ORAL 2 TIMES DAILY PRN
Qty: 60 TABLET | Refills: 0 | Status: SHIPPED | OUTPATIENT
Start: 2024-10-15

## 2024-10-15 NOTE — TELEPHONE ENCOUNTER
" Pt message via Multichannel-  \"Why was my Bentyl refill denied?\"  Pt last RX sent QTY of 60 on 2/28/24.  Pt next OV 12/2024.  Please advise on refill? Thanks, CG  "

## 2024-10-22 ENCOUNTER — APPOINTMENT (OUTPATIENT)
Dept: OBSTETRICS AND GYNECOLOGY | Facility: CLINIC | Age: 44
End: 2024-10-22
Payer: COMMERCIAL

## 2024-12-12 ENCOUNTER — ALLIED HEALTH (OUTPATIENT)
Dept: INTEGRATIVE MEDICINE | Facility: CLINIC | Age: 44
End: 2024-12-12
Payer: COMMERCIAL

## 2024-12-12 ASSESSMENT — PAIN SCALES - GENERAL: PAINLEVEL_OUTOF10: 2

## 2024-12-12 NOTE — PROGRESS NOTES
"Stephanie is here for a new patient exam for neck, head, and jaw pain and tension.  Stephanie's pain has increased since she transitioned to \"stay at home\" work.  She had been traveling which helped with her movement, but now finds herself sitting, working on the computer all day.  She has a history of clenching and grinding, with bite guard.  She felt the bite guard made the pain worse.  Her dentist did some adjustment, which helped ease her pain.  No tingling, numbness in arms or legs.  She feels intense tension constantly, with pain occurring for day, every few weeks.  She uses heat and OTC meds for relief.  "

## 2024-12-30 ENCOUNTER — APPOINTMENT (OUTPATIENT)
Dept: PRIMARY CARE | Facility: CLINIC | Age: 44
End: 2024-12-30
Payer: COMMERCIAL

## 2024-12-30 VITALS
SYSTOLIC BLOOD PRESSURE: 120 MMHG | TEMPERATURE: 97.6 F | OXYGEN SATURATION: 99 % | HEART RATE: 101 BPM | WEIGHT: 143.4 LBS | BODY MASS INDEX: 25 KG/M2 | DIASTOLIC BLOOD PRESSURE: 88 MMHG

## 2024-12-30 DIAGNOSIS — R61 GENERALIZED HYPERHIDROSIS: Primary | ICD-10-CM

## 2024-12-30 DIAGNOSIS — K58.0 IRRITABLE BOWEL SYNDROME WITH DIARRHEA: ICD-10-CM

## 2024-12-30 DIAGNOSIS — F32.0 MILD MAJOR DEPRESSION (CMS-HCC): ICD-10-CM

## 2024-12-30 DIAGNOSIS — G47.00 INSOMNIA, UNSPECIFIED TYPE: ICD-10-CM

## 2024-12-30 DIAGNOSIS — F41.1 GAD (GENERALIZED ANXIETY DISORDER): ICD-10-CM

## 2024-12-30 PROCEDURE — 99214 OFFICE O/P EST MOD 30 MIN: CPT | Performed by: PHYSICIAN ASSISTANT

## 2024-12-30 PROCEDURE — 1036F TOBACCO NON-USER: CPT | Performed by: PHYSICIAN ASSISTANT

## 2024-12-30 RX ORDER — CITALOPRAM 20 MG/1
30 TABLET, FILM COATED ORAL DAILY
Qty: 135 TABLET | Refills: 1 | Status: SHIPPED | OUTPATIENT
Start: 2024-12-30

## 2024-12-30 RX ORDER — HYDROXYZINE HYDROCHLORIDE 25 MG/1
TABLET, FILM COATED ORAL
Qty: 90 TABLET | Refills: 1 | Status: SHIPPED | OUTPATIENT
Start: 2024-12-30

## 2024-12-30 RX ORDER — BUPROPION HYDROCHLORIDE 300 MG/1
300 TABLET ORAL EVERY MORNING
Qty: 90 TABLET | Refills: 1 | Status: SHIPPED | OUTPATIENT
Start: 2024-12-30 | End: 2025-06-28

## 2024-12-30 RX ORDER — DICYCLOMINE HYDROCHLORIDE 20 MG/1
20 TABLET ORAL 2 TIMES DAILY PRN
Qty: 60 TABLET | Refills: 1 | Status: SHIPPED | OUTPATIENT
Start: 2024-12-30

## 2024-12-30 RX ORDER — ALUMINUM CHLORIDE 20 %
SOLUTION, NON-ORAL TOPICAL
Qty: 37.5 ML | Refills: 1 | Status: SHIPPED | OUTPATIENT
Start: 2024-12-30

## 2024-12-30 ASSESSMENT — ENCOUNTER SYMPTOMS
SHORTNESS OF BREATH: 0
ABDOMINAL PAIN: 0

## 2024-12-30 NOTE — PROGRESS NOTES
Subjective   Patient ID: Tara L Berardinelli is a 44 y.o. female who presents for Anxiety (recheck), Depression, Insomnia, Irritable Bowel Syndrome, and Excessive Sweating.    HPI   Patient presents for recheck of anxiety/depression, IBS, and hyperhidrosis.    Anxiety and depression: Taking and tolerating Wellbutrin XL 300mg and Citalopram 30mg every day. Doing ok. Anxiety and depression fairly well controlled with meds.       Insomnia: Needing to use hydroxyzine 25mg every night to help sleep.      IBS: Still doing fairly well. Only needs to take Bentyl rarely.  No abdominal pain.     Hyperhidrosis: Well controlled with Drysol that she only needs to use about once per month. Tolerates med well.      Sees rheumatologist Dr Swenson for chronic intermittent arthralgias and Sjogren's. Continues to do well with Plaquenil.  Was there 7/29/24.  No changes.    Lipids were good 9/3/24.  LDL Calculated (mg/dL)   Date Value   09/03/2024 54     LDL (mg/dL)   Date Value   08/25/2021 67   06/05/2020 60     Triglycerides (mg/dL)   Date Value   09/03/2024 141   08/25/2021 109   06/05/2020 83     HDL-Cholesterol (mg/dL)   Date Value   09/03/2024 44.4     HDL (mg/dL)   Date Value   08/25/2021 42.9   06/05/2020 41.4       Hasn't been as good with her exercise lately.  Started Tirzepatide injections about once per week for wt loss. Losing wt.  Tolerating med.     Saw ob/gyn 9/10/24 (Dr Landry Osborne).      reports that she quit smoking about 22 years ago. Her smoking use included cigarettes. She has never used smokeless tobacco.    Review of Systems   Respiratory:  Negative for shortness of breath.    Cardiovascular:  Negative for chest pain.   Gastrointestinal:  Negative for abdominal pain.       Objective   /88   Pulse 101   Temp 36.4 °C (97.6 °F)   Wt 65 kg (143 lb 6.4 oz)   SpO2 99%   BMI 25.00 kg/m²     Physical Exam  Vitals and nursing note reviewed.   Constitutional:       Appearance: Normal appearance. She is  well-developed.   Eyes:      General: No scleral icterus.  Cardiovascular:      Rate and Rhythm: Normal rate and regular rhythm.      Heart sounds: No murmur heard.  Pulmonary:      Effort: Pulmonary effort is normal.      Breath sounds: Normal breath sounds.   Abdominal:      Palpations: Abdomen is soft. There is no mass.   Musculoskeletal:      Cervical back: Neck supple.   Skin:     General: Skin is warm and dry.   Neurological:      Mental Status: She is alert.       Assessment/Plan   Diagnoses and all orders for this visit:  Generalized hyperhidrosis  -     aluminum chloride (Drysol) 20 % external solution; APPLY TOPICALLY TWICE A WEEK AT BEDTIME AS DIRECTED  Mild major depression (CMS-HCC)  -     buPROPion XL (Wellbutrin XL) 300 mg 24 hr tablet; Take 1 tablet (300 mg) by mouth once daily in the morning. Do not crush, chew, or split.  -     citalopram (CeleXA) 20 mg tablet; Take 1.5 tablets (30 mg) by mouth once daily.  EVANGELINA (generalized anxiety disorder)  -     buPROPion XL (Wellbutrin XL) 300 mg 24 hr tablet; Take 1 tablet (300 mg) by mouth once daily in the morning. Do not crush, chew, or split.  -     citalopram (CeleXA) 20 mg tablet; Take 1.5 tablets (30 mg) by mouth once daily.  Irritable bowel syndrome with diarrhea  -     dicyclomine (Bentyl) 20 mg tablet; Take 1 tablet (20 mg) by mouth 2 times a day as needed (IBS symptoms).  Insomnia, unspecified type  -     hydrOXYzine HCL (Atarax) 25 mg tablet; TAKE 1 TABLET BY MOUTH AT BEDTIME AS NEEDED       Counseled patient.    Reviewed recent labs.   Refilled meds.   Discussed diet and exercise.    Follow up in 5 months for recheck of anxiety, depression, insomnia, IBS, hyperhidrosis.

## 2025-01-08 ENCOUNTER — TELEPHONE (OUTPATIENT)
Dept: PRIMARY CARE | Facility: CLINIC | Age: 45
End: 2025-01-08
Payer: COMMERCIAL

## 2025-01-08 NOTE — TELEPHONE ENCOUNTER
Pt called rx line @ 11:34am stating she was seen last week with [facility], Is still having increased anxiety, Has had 2 other doctor's appointments and her heart rate and BP was elevated. Pt wanting to know if [facility] can send something in for her to take the edge off?  OK for med?  Would you like to see pt again?  Please advise. Thanks. JW

## 2025-01-09 ENCOUNTER — APPOINTMENT (OUTPATIENT)
Dept: RADIOLOGY | Facility: CLINIC | Age: 45
End: 2025-01-09
Payer: COMMERCIAL

## 2025-01-09 NOTE — TELEPHONE ENCOUNTER
Called pt and informed of [facility] msg, she will need a new rx. I told her to cb closer when refill was needed

## 2025-02-06 ENCOUNTER — HOSPITAL ENCOUNTER (OUTPATIENT)
Dept: RADIOLOGY | Facility: CLINIC | Age: 45
Discharge: HOME | End: 2025-02-06
Payer: COMMERCIAL

## 2025-02-06 VITALS — HEIGHT: 64 IN | WEIGHT: 140 LBS | BODY MASS INDEX: 23.9 KG/M2

## 2025-02-06 DIAGNOSIS — Z12.31 ENCOUNTER FOR SCREENING MAMMOGRAM FOR MALIGNANT NEOPLASM OF BREAST: ICD-10-CM

## 2025-02-06 PROCEDURE — 77063 BREAST TOMOSYNTHESIS BI: CPT

## 2025-02-06 PROCEDURE — 77067 SCR MAMMO BI INCL CAD: CPT

## 2025-02-19 ENCOUNTER — APPOINTMENT (OUTPATIENT)
Dept: DERMATOLOGY | Facility: CLINIC | Age: 45
End: 2025-02-19
Payer: COMMERCIAL

## 2025-02-19 DIAGNOSIS — Z12.83 SKIN CANCER SCREENING: ICD-10-CM

## 2025-02-19 DIAGNOSIS — L70.0 ACNE VULGARIS: Primary | ICD-10-CM

## 2025-02-19 PROCEDURE — 1036F TOBACCO NON-USER: CPT | Performed by: NURSE PRACTITIONER

## 2025-02-19 PROCEDURE — 99203 OFFICE O/P NEW LOW 30 MIN: CPT | Performed by: NURSE PRACTITIONER

## 2025-02-19 RX ORDER — TRETINOIN 0.25 MG/G
CREAM TOPICAL
Qty: 45 G | Refills: 5 | Status: SHIPPED | OUTPATIENT
Start: 2025-02-19

## 2025-02-19 RX ORDER — SPIRONOLACTONE 50 MG/1
TABLET, FILM COATED ORAL
Qty: 180 TABLET | Refills: 2 | Status: SHIPPED | OUTPATIENT
Start: 2025-02-19

## 2025-02-19 NOTE — PROGRESS NOTES
Subjective     Tara L Berardinelli is a 44 y.o. female who presents for the following: Acne (Pt presents for acne to chin.  Pt states she is using lots of OTC products. The most effective has been tea tree serum.).     Review of Systems:  No other skin or systemic complaints other than what is documented elsewhere in the note.    The following portions of the chart were reviewed this encounter and updated as appropriate:  Tobacco  Allergies  Meds  Problems  Med Hx  Surg Hx  Fam Hx       Skin Cancer History  No skin cancer on file.    Specialty Problems    None    Past Medical History:  Tara L Berardinelli  has a past medical history of Personal history of other diseases of the circulatory system, Personal history of other diseases of the digestive system, Personal history of other mental and behavioral disorders (12/27/2013), and Sjogren syndrome, unspecified (Multi).    Past Surgical History:  Tara L Berardinelli  has a past surgical history that includes Other surgical history (11/22/2019) and Other surgical history (11/22/2019).    Family History:  Patient family history includes Breast cancer (age of onset: 44) in her father's sister; Cancer in her father, maternal grandfather, paternal grandfather, and another family member; Diabetes in an other family member.    Social History:  Tara L Berardinelli  reports that she quit smoking about 22 years ago. Her smoking use included cigarettes. She has never used smokeless tobacco. She reports current alcohol use of about 2.0 standard drinks of alcohol per week. She reports that she does not use drugs.    Allergies:  Patient has no known allergies.    Current Medications / CAM's:    Current Outpatient Medications:     abacavir-lamiVUDine-zidovudine (Trizivir) 300-150-300 mg tablet, Take 1 tablet by mouth 2 times a day., Disp: , Rfl:     aluminum chloride (Drysol) 20 % external solution, APPLY TOPICALLY TWICE A WEEK AT BEDTIME AS DIRECTED, Disp: 37.5 mL, Rfl: 1     buPROPion XL (Wellbutrin XL) 300 mg 24 hr tablet, Take 1 tablet (300 mg) by mouth once daily in the morning. Do not crush, chew, or split., Disp: 90 tablet, Rfl: 1    citalopram (CeleXA) 20 mg tablet, Take 1.5 tablets (30 mg) by mouth once daily., Disp: 135 tablet, Rfl: 1    dicyclomine (Bentyl) 20 mg tablet, Take 1 tablet (20 mg) by mouth 2 times a day as needed (IBS symptoms)., Disp: 60 tablet, Rfl: 1    hydroxychloroquine (Plaquenil) 200 mg tablet, once every 24 hours., Disp: , Rfl:     hydrOXYzine HCL (Atarax) 25 mg tablet, TAKE 1 TABLET BY MOUTH AT BEDTIME AS NEEDED, Disp: 90 tablet, Rfl: 1    levonorgestrel (Mirena) 21 mcg/24 hours (8 yrs) 52 mg IUD, , Disp: , Rfl:     tirzepatide 5 mg/0.5 mL pen injector, Inject 5 mg under the skin every 7 days., Disp: , Rfl:      Objective   Well appearing patient in no apparent distress; mood and affect are within normal limits.    A focused skin examination was performed. All findings within normal limits unless otherwise noted below.    Assessment/Plan   1. Acne vulgaris  Head - Anterior (Face)  Scattered deep-seated cysts on jawline    -Discussed the nature of the condition  -Discussed treatment options  -Recommend: Start spironolactone, take as directed. Start tretinoin, use as directed.   -Discussed/information given on the potential adverse effects of spironolactone including but not limited to hypotension, diuresis, muscle cramps, fatigue, breast tenderness, menstrual irregularities, hyperkalemia which may result in muscle dysfunction (although current evidence suggests no need to monitor potassium levels in young healthy adult females).   -Discussed common side effects of lightheadedness or dizziness, which usually resolve within a few days as the body adjusts to the medication if fluid intake is appropriate. Discussed if the patient is feeling unwell or fainting, to discontinue the medication and contact our office.  -Discussed not to take any prescription  potassium supplements while taking spironolactone, and to avoid excessive consumption of food/drink containing potassium (such as coconut water).  -There is a black box warning of tumorigenesis in rodents taking very high doses of spironolactone. Epidemiologic studies have shown no increase in malignancy in humans.   -If patient is of child bearing potential, patient counseled that they should NOT get pregnant while on this medication as there is risk of teratogenicity/birth defects and needs to use contraception while taking this medication     2. Skin cancer screening    Related Procedures  Follow Up In Dermatology - Established Patient    Follow up in 6 months for a total body skin exam. Please call me if there are any changes or development of concerning symptoms (lesion/skin condition is changing, bleeding, enlarging, or worsening).

## 2025-05-30 ENCOUNTER — APPOINTMENT (OUTPATIENT)
Dept: PRIMARY CARE | Facility: CLINIC | Age: 45
End: 2025-05-30
Payer: COMMERCIAL

## 2025-05-30 VITALS
BODY MASS INDEX: 23.52 KG/M2 | OXYGEN SATURATION: 97 % | DIASTOLIC BLOOD PRESSURE: 68 MMHG | WEIGHT: 137 LBS | HEART RATE: 98 BPM | TEMPERATURE: 97.2 F | SYSTOLIC BLOOD PRESSURE: 98 MMHG

## 2025-05-30 DIAGNOSIS — K58.0 IRRITABLE BOWEL SYNDROME WITH DIARRHEA: ICD-10-CM

## 2025-05-30 DIAGNOSIS — G47.00 INSOMNIA, UNSPECIFIED TYPE: ICD-10-CM

## 2025-05-30 DIAGNOSIS — F41.1 GAD (GENERALIZED ANXIETY DISORDER): ICD-10-CM

## 2025-05-30 DIAGNOSIS — R61 GENERALIZED HYPERHIDROSIS: ICD-10-CM

## 2025-05-30 DIAGNOSIS — F32.0 MILD MAJOR DEPRESSION: Primary | ICD-10-CM

## 2025-05-30 PROCEDURE — 99214 OFFICE O/P EST MOD 30 MIN: CPT | Performed by: PHYSICIAN ASSISTANT

## 2025-05-30 PROCEDURE — 1036F TOBACCO NON-USER: CPT | Performed by: PHYSICIAN ASSISTANT

## 2025-05-30 RX ORDER — BUPROPION HYDROCHLORIDE 300 MG/1
300 TABLET ORAL EVERY MORNING
Qty: 90 TABLET | Refills: 1 | Status: SHIPPED | OUTPATIENT
Start: 2025-05-30 | End: 2025-11-26

## 2025-05-30 RX ORDER — TRAZODONE HYDROCHLORIDE 50 MG/1
50 TABLET ORAL NIGHTLY PRN
Qty: 90 TABLET | Refills: 1 | Status: SHIPPED | OUTPATIENT
Start: 2025-05-30

## 2025-05-30 RX ORDER — DICYCLOMINE HYDROCHLORIDE 20 MG/1
20 TABLET ORAL 2 TIMES DAILY PRN
Qty: 60 TABLET | Refills: 1 | Status: SHIPPED | OUTPATIENT
Start: 2025-05-30

## 2025-05-30 RX ORDER — VALACYCLOVIR HYDROCHLORIDE 1 G/1
2 TABLET, FILM COATED ORAL
COMMUNITY
Start: 2025-05-26

## 2025-05-30 RX ORDER — CITALOPRAM 40 MG/1
40 TABLET ORAL DAILY
Qty: 90 TABLET | Refills: 1 | Status: SHIPPED | OUTPATIENT
Start: 2025-05-30

## 2025-05-30 RX ORDER — ALUMINUM CHLORIDE 20 %
SOLUTION, NON-ORAL TOPICAL
Qty: 37.5 ML | Refills: 1 | Status: SHIPPED | OUTPATIENT
Start: 2025-05-30

## 2025-05-30 ASSESSMENT — ENCOUNTER SYMPTOMS
SHORTNESS OF BREATH: 0
ABDOMINAL PAIN: 0

## 2025-05-30 NOTE — PROGRESS NOTES
Subjective   Patient ID: Tara L Berardinelli is a 44 y.o. female who presents for Anxiety (Recheck), Depression, Insomnia, Irritable Bowel Syndrome, and Excessive Sweating.    HPI   Patient presents for recheck of anxiety/depression, IBS, and hyperhidrosis.    Anxiety and depression: Controlled. Taking and tolerating Wellbutrin XL 300mg and Citalopram 40mg every day. Done better on the 40mg dose.     Insomnia: Needing to use hydroxyzine 25mg every night to help sleep but it isn't working as well the past 6 months. Waking up during night a lot and hard to fall back asleep.  No significant snoring.  No noted apnea by . Mom has LAN.       IBS: Still doing fairly well. Only needs to take Bentyl rarely.  No abdominal pain.     Hyperhidrosis: Well controlled with Drysol that she uses infrequently (about once per month). Tolerates med well.      Sees rheumatologist Dr Swenson for chronic intermittent arthralgias and Sjogren's. Continues to do well with Plaquenil.  Was there 2/5/25.  No changes.    Lipids were good 9/3/24.  LDL Calculated (mg/dL)   Date Value   09/03/2024 54     LDL (mg/dL)   Date Value   08/25/2021 67   06/05/2020 60     Triglycerides (mg/dL)   Date Value   09/03/2024 141   08/25/2021 109   06/05/2020 83     HDL-Cholesterol (mg/dL)   Date Value   09/03/2024 44.4     HDL (mg/dL)   Date Value   08/25/2021 42.9   06/05/2020 41.4     Been exercising 3-4 days per week.  Switched from tirzepatide Semaglutide 7mg injections a few months ago but only uses it every few weeks just to keep her appetite suppressed.     Saw ob/gyn 9/10/24 (Dr Landry Osborne).      reports that she quit smoking about 22 years ago. Her smoking use included cigarettes. She has never used smokeless tobacco.    Review of Systems   Respiratory:  Negative for shortness of breath.    Cardiovascular:  Negative for chest pain.   Gastrointestinal:  Negative for abdominal pain.       Objective   BP 98/68   Pulse 98   Temp 36.2 °C (97.2  °F)   Wt 62.1 kg (137 lb)   SpO2 97%   BMI 23.52 kg/m²     Physical Exam  Vitals and nursing note reviewed.   Constitutional:       Appearance: Normal appearance. She is well-developed.   Eyes:      General: No scleral icterus.  Cardiovascular:      Rate and Rhythm: Normal rate and regular rhythm.      Heart sounds: No murmur heard.  Pulmonary:      Effort: Pulmonary effort is normal.      Breath sounds: Normal breath sounds.   Abdominal:      Palpations: Abdomen is soft. There is no mass.   Musculoskeletal:      Cervical back: Neck supple.   Skin:     General: Skin is warm and dry.   Neurological:      Mental Status: She is alert.       Assessment/Plan   Diagnoses and all orders for this visit:  Mild major depression  -     buPROPion XL (Wellbutrin XL) 300 mg 24 hr tablet; Take 1 tablet (300 mg) by mouth once daily in the morning. Do not crush, chew, or split.  -     citalopram (CeleXA) 40 mg tablet; Take 1 tablet (40 mg) by mouth once daily.  EVANGELINA (generalized anxiety disorder)  -     buPROPion XL (Wellbutrin XL) 300 mg 24 hr tablet; Take 1 tablet (300 mg) by mouth once daily in the morning. Do not crush, chew, or split.  -     citalopram (CeleXA) 40 mg tablet; Take 1 tablet (40 mg) by mouth once daily.  Insomnia, unspecified type  -     traZODone (Desyrel) 50 mg tablet; Take 1 tablet (50 mg) by mouth as needed at bedtime for sleep.  Irritable bowel syndrome with diarrhea  -     dicyclomine (Bentyl) 20 mg tablet; Take 1 tablet (20 mg) by mouth 2 times a day as needed (IBS symptoms).  Generalized hyperhidrosis  -     aluminum chloride (Drysol) 20 % external solution; APPLY TOPICALLY TWICE A WEEK AT BEDTIME AS DIRECTED  Other orders  -     Follow Up In Primary Care; Future         Counseled patient.    Refilled meds.   Switch from hydroxyzine to Rx Trazodone 50mg at hs for insomnia. If sleep not improving, will get sleep study to evaluate further.   Discussed diet and exercise.    Follow up in 5 months for recheck  of anxiety, depression, insomnia, IBS, hyperhidrosis.

## 2025-07-16 ENCOUNTER — PATIENT OUTREACH (OUTPATIENT)
Dept: PRIMARY CARE | Facility: CLINIC | Age: 45
End: 2025-07-16
Payer: COMMERCIAL

## 2025-07-16 NOTE — PROGRESS NOTES
Discharge Facility: [facility]   Discharge Diagnosis: Chest pain (Primary Dx);   NSTEMI (non-ST elevated myocardial infarction) (HCC);   Other chest pain   Admission Date: 7/14/25  Discharge Date: 7/15/25    PCP Appointment Date:   Specialist Appointment Date: 8/4/25 - Cardiology   Hospital Encounter and Summary Linked:    Hospital Encounter with Corona Aaron MD; Sharita Watson MD     See discharge assessment below for further details    Two attempts were made to reach patient within two business days after discharge. Left voicemail with contact information for patient to call back with any non-emergent questions or concerns.

## 2025-07-21 ENCOUNTER — OFFICE VISIT (OUTPATIENT)
Dept: CARDIOLOGY | Facility: HOSPITAL | Age: 45
End: 2025-07-21
Payer: COMMERCIAL

## 2025-07-21 VITALS
HEART RATE: 86 BPM | WEIGHT: 130 LBS | SYSTOLIC BLOOD PRESSURE: 105 MMHG | HEIGHT: 64 IN | BODY MASS INDEX: 22.2 KG/M2 | DIASTOLIC BLOOD PRESSURE: 72 MMHG | OXYGEN SATURATION: 100 %

## 2025-07-21 DIAGNOSIS — I22.2 SUBSEQUENT NON-ST ELEVATION (NSTEMI) MYOCARDIAL INFARCTION: Primary | ICD-10-CM

## 2025-07-21 DIAGNOSIS — M35.00 SJOGREN'S SYNDROME, WITH UNSPECIFIED ORGAN INVOLVEMENT (MULTI): ICD-10-CM

## 2025-07-21 DIAGNOSIS — F41.9 ANXIETY: ICD-10-CM

## 2025-07-21 LAB
ATRIAL RATE: 86 BPM
P AXIS: 72 DEGREES
P OFFSET: 201 MS
P ONSET: 156 MS
PR INTERVAL: 136 MS
Q ONSET: 224 MS
QRS COUNT: 14 BEATS
QRS DURATION: 90 MS
QT INTERVAL: 370 MS
QTC CALCULATION(BAZETT): 442 MS
QTC FREDERICIA: 417 MS
R AXIS: 103 DEGREES
T AXIS: 80 DEGREES
T OFFSET: 409 MS
VENTRICULAR RATE: 86 BPM

## 2025-07-21 PROCEDURE — 99203 OFFICE O/P NEW LOW 30 MIN: CPT

## 2025-07-21 PROCEDURE — 93005 ELECTROCARDIOGRAM TRACING: CPT | Performed by: INTERNAL MEDICINE

## 2025-07-21 PROCEDURE — 3008F BODY MASS INDEX DOCD: CPT | Performed by: INTERNAL MEDICINE

## 2025-07-21 PROCEDURE — 99204 OFFICE O/P NEW MOD 45 MIN: CPT | Performed by: INTERNAL MEDICINE

## 2025-07-21 RX ORDER — METOPROLOL SUCCINATE 25 MG/1
25 TABLET, EXTENDED RELEASE ORAL
COMMUNITY
Start: 2025-07-15 | End: 2026-07-15

## 2025-07-21 RX ORDER — ALPRAZOLAM 0.5 MG/1
1 TABLET ORAL
COMMUNITY
Start: 2025-07-19

## 2025-07-21 RX ORDER — CLOPIDOGREL BISULFATE 75 MG/1
75 TABLET ORAL
COMMUNITY
Start: 2025-07-16 | End: 2026-07-15

## 2025-07-21 RX ORDER — ASPIRIN 81 MG/1
81 TABLET ORAL
COMMUNITY
Start: 2025-07-16 | End: 2026-07-16

## 2025-07-21 NOTE — PROGRESS NOTES
"Referred by Elian Muhammad for post-NSTEMI care.     History Of Present Illness:    Tara L Berardinelli is a 44 y.o. female presenting with recent NSTEMI [facility].    PCP: AMANDA Streeter    Rheumatologist: Denzel Swenson MD ([facility]).    Prior CV history:  Admitted via ER to [facility] for chest pain radiating to left arm and back accompanied by nausea, vomiting and marked diaphoresis. Evaluation notable for absence of significant ECG changes. but elevation in her high sensitivity troponin. Taken for [facility] for further evaluation. Mild single-vessel coronary disease with 30% proximal to mid LAD stenosis involving the origin of a medium sized first diagonal branch. No intravascular imaging performed to rule out SCAD. She was given diagnosis of \"MINOCA (myocardial infarction with no obstructed coronary arteries) and likely Takotsubo cardiomyopathy given wall motion abnormalities noted on ventriculography with mild left ventricular systolic dysfunction.\" She had an echocardiogram which showed normal EF, wall motion, and no significant valvular disease. She was started on DAPT for her MINOCA/Takotsubo. She was discharged home on metoprolol XL 25 mg and rosuvastatin 40 mg.    Prior medical history notable for Sjogren's with arthritis/joint pain and fatigue. Followed at [facility] by Dr. Denzel Swenson on plaquenil only.     CRF negative for HTN, hyperlipidemia, smoking or DM. Family history notable for open hear surgery x2 in grandmother that sounds valvular, not coronary related.     Presently, no recurrent chest/arm/back pain or episodes of nausea/vomiting. Previously very active with regular aerobic and weight training. No prior activity limitations. Sjogren's stable in prior weeks/months on Plaquenil with no recent flare. No prior vascular events. History of anxiety and depression. Reports worsening of anxiety with addition of bupropion.    NKDA    PSH: Breast reduction, hernia repair and " tonsillectomy.    PMH: IBS, Sjogren's, anxiety and depression. Recent rials of Semaglutide/Wegovy and Mounjaro/Zepbound. Not currently using.        Past Medical History:  She has a past medical history of Fibrocystic breast (2019), Personal history of other diseases of the circulatory system, Personal history of other diseases of the digestive system, Personal history of other mental and behavioral disorders (12/27/2013), and Sjogren syndrome, unspecified (Multi).    She has no past medical history of Personal history of irradiation.    Past Surgical History:  She has a past surgical history that includes Other surgical history (11/22/2019); Other surgical history (11/22/2019); Breast biopsy (9/2024); and Reduction mammaplasty (9/24).      Social History:  She reports that she quit smoking about 22 years ago. Her smoking use included cigarettes. She has never used smokeless tobacco. She reports current alcohol use of about 2.0 standard drinks of alcohol per week. She reports that she does not use drugs.    Family History:  Family history of valve surgert in grandmother x 2. Details unknown.     Allergies:  Patient has no known allergies.    Outpatient Medications:  Current Outpatient Medications   Medication Instructions    ALPRAZolam (Xanax) 0.5 mg tablet 1 tablet, Every 12 hours scheduled (0630,1830)    aluminum chloride (Drysol) 20 % external solution APPLY TOPICALLY TWICE A WEEK AT BEDTIME AS DIRECTED    aspirin 81 mg, Daily RT    buPROPion XL (WELLBUTRIN XL) 300 mg, oral, Every morning, Do not crush, chew, or split.    citalopram (CELEXA) 40 mg, oral, Daily    clopidogrel (PLAVIX) 75 mg, Daily RT    dicyclomine (BENTYL) 20 mg, oral, 2 times daily PRN    hydroxychloroquine (Plaquenil) 200 mg tablet Every 24 hours    levonorgestrel (Mirena) 21 mcg/24 hours (8 yrs) 52 mg IUD     metoprolol succinate XL (TOPROL-XL) 25 mg, Daily RT    SEMAGLUTIDE SUBQ Inject under the skin.    spironolactone (Aldactone) 50 mg  "tablet Take 1 tablet in the AM for 2 weeks, then increase to 2 tablets in the AM daily.    tirzepatide 5 mg, Every 7 days    traZODone (DESYREL) 50 mg, oral, Nightly PRN    tretinoin (Retin-A) 0.025 % cream Apply a pea-size amount to entire face at bedtime. Decrease use if too drying.        Last Recorded Vitals:  Vitals:    07/21/25 0930   BP: 105/72   BP Location: Left arm   Patient Position: Sitting   BP Cuff Size: Adult   Pulse: 86   SpO2: 100%   Weight: 59 kg (130 lb)   Height: 1.626 m (5' 4\")       Physical Exam:    Skin: No rash.     HEENT: Non-palpable thyroid.     Lungs: Clear.     Cardiac: There is no jugular venous distension. PMI non-displaced. S1, S2. No S3. No S4. No murmurs.     Abdomen: No hepatosplenomegaly. No abdominal bruit. Normal aortic impulse.    Extremities: No edema.    Pulses: Carotid 2+ bilateral. No carotid bruits. Radial 2+ bilateral. Popliteal 2+ bilateral. Posterior tibial 2+ bilateral.    Neuro: Non-focal. Normal gait.       Last Labs:  CBC -  No results found for: \"WBC\", \"HGB\", \"HCT\", \"MCV\", \"PLT\"    CMP -  Lab Results   Component Value Date    CALCIUM 8.5 (L) 09/03/2024       LIPID PANEL -   Lab Results   Component Value Date    CHOL 127 09/03/2024    HDL 44.4 09/03/2024    CHHDL 2.9 09/03/2024    VLDL 28 09/03/2024    TRIG 141 09/03/2024    NHDL 83 09/03/2024       RENAL FUNCTION PANEL -   Lab Results   Component Value Date    K 4.0 09/03/2024       No results found for: \"BNP\", \"HGBA1C\"    Last Cardiology Tests:  ECG:  Review of the ECG by me shows normal sinus rhythm. Rightward axis. PRWP c/w CWR vs. lead placement given discharge echo with normal LV function and no RWMA. Non-specific T wave abn c/w recent LAD territory NSTEMI.    Assessment/Plan   44 year-old woman with history of Sjogren's presents for first post-NSTEMI visit for 2nd opinion after discharge from [facility]. Presentation consistent with ACS and NSTEMI with likely etiology SCAD in setting of Sjogren's. " Intravascular imaging not performed at time of cardiac cath. Discharge diagnosis of Takotsubo/MINOCA seems less likely. Strongly recommend referral to Dr. Orquidea Patterson, expert in SCAD and FMD.  Will require systemic work-up given association of FMD with SCAD in Sjoogren's. Will request push of cath lab images from [facility] and will consider CT angio to assess LAD dissection if necessary after Dr. Patterson's evaluation. Continue DAPT, metoprolol and rosuvastatin for now. Hold off on cardiac rehab or exercise until visit with Dr. Patterson. Instructed to to call for recurrent chest pain/back pain/arm pain or diaphoresis.      Ramon Gamboa MD

## 2025-07-23 ENCOUNTER — HOSPITAL ENCOUNTER (OUTPATIENT)
Dept: RADIOLOGY | Facility: EXTERNAL LOCATION | Age: 45
Discharge: HOME | End: 2025-07-23

## 2025-07-28 ENCOUNTER — APPOINTMENT (OUTPATIENT)
Dept: PRIMARY CARE | Facility: CLINIC | Age: 45
End: 2025-07-28
Payer: COMMERCIAL

## 2025-07-28 VITALS
BODY MASS INDEX: 22.55 KG/M2 | TEMPERATURE: 97.7 F | DIASTOLIC BLOOD PRESSURE: 70 MMHG | OXYGEN SATURATION: 99 % | WEIGHT: 131.4 LBS | HEART RATE: 67 BPM | SYSTOLIC BLOOD PRESSURE: 118 MMHG

## 2025-07-28 DIAGNOSIS — I21.9 MYOCARDIAL INFARCTION WITH NONOBSTRUCTIVE CORONARY ARTERIES (MULTI): ICD-10-CM

## 2025-07-28 DIAGNOSIS — F41.1 GAD (GENERALIZED ANXIETY DISORDER): Primary | ICD-10-CM

## 2025-07-28 PROCEDURE — 99214 OFFICE O/P EST MOD 30 MIN: CPT | Performed by: PHYSICIAN ASSISTANT

## 2025-07-28 RX ORDER — BUSPIRONE HYDROCHLORIDE 5 MG/1
5 TABLET ORAL 2 TIMES DAILY
Qty: 60 TABLET | Refills: 0 | Status: SHIPPED | OUTPATIENT
Start: 2025-07-28

## 2025-07-28 ASSESSMENT — PATIENT HEALTH QUESTIONNAIRE - PHQ9
SUM OF ALL RESPONSES TO PHQ9 QUESTIONS 1 AND 2: 0
1. LITTLE INTEREST OR PLEASURE IN DOING THINGS: NOT AT ALL
2. FEELING DOWN, DEPRESSED OR HOPELESS: NOT AT ALL

## 2025-07-28 ASSESSMENT — ENCOUNTER SYMPTOMS
SHORTNESS OF BREATH: 0
PALPITATIONS: 0

## 2025-07-28 NOTE — PROGRESS NOTES
"Subjective   Patient ID: Tara L Berardinelli is a 44 y.o. female who presents for ER Follow-up ([facility], 7/14, Heart Attack).    HPI   For hospital follow-up for MI.    Went to [facility] ER for chest pain radiating to left arm and back accompanied by nausea, vomiting and marked diaphoresis. No significant ECG changes. but elevation in her high sensitivity troponin that trended up. Had heart cath. Mild single-vessel coronary disease with 30% proximal to mid LAD stenosis involving the origin of a medium sized first diagonal branch. She was diagnosed with \"MINOCA (myocardial infarction with no obstructed coronary arteries) and likely Takotsubo cardiomyopathy given wall motion abnormalities noted on ventriculography with mild left ventricular systolic dysfunction.\" She had an echocardiogram which showed normal EF, wall motion, and no significant valvular disease. She was started on DAPT for her MINOCA/Takotsubo. She was discharged home on metoprolol XL 25 mg and rosuvastatin 40 mg, which she is tolerating well.     Saw cardiology Dr Ramon Gamboa 7/21/25 and he is suspicious that this may have been a case of Spontaneous coronary artery dissection (SCAD), possibly related to her Sjogrens. He referred to to Dr Orquidea Patterson, who specializes in this. Has appt 7/31/25.      Patient has not had any recurrence of chest pains or nausea or vomiting.  Main complaint is that she is just feeling very anxious since all of this happened and was having a lot of stress prior to this occurring.  Stopped taking her Wellbutrin since talked with a friend and some psychiatry that stated it may be aggravating her anxiety.  Therefore just taking citalopram daily and trazodone at night.  Sleeping okay.  Feeling very anxious and mind racing a lot.  Was given a few Xanax which did temporarily help calm her down.    Social History     Socioeconomic History    Marital status:    Tobacco Use    Smoking status: Former     Current " packs/day: 0.00     Types: Cigarettes     Quit date:      Years since quittin.5    Smokeless tobacco: Never    Tobacco comments:     Patient smoked in high school and college   Vaping Use    Vaping status: Never Used   Substance and Sexual Activity    Alcohol use: Yes     Alcohol/week: 2.0 standard drinks of alcohol     Types: 2 Standard drinks or equivalent per week    Drug use: Never       Review of Systems   Respiratory:  Negative for shortness of breath.    Cardiovascular:  Negative for chest pain and palpitations.       Objective   /70   Pulse 67   Temp 36.5 °C (97.7 °F)   Wt 59.6 kg (131 lb 6.4 oz)   SpO2 99%   BMI 22.55 kg/m²     Physical Exam  Vitals and nursing note reviewed.   Constitutional:       General: She is not in acute distress.     Appearance: Normal appearance. She is well-developed.     Eyes:      General: No scleral icterus.    Neck:      Vascular: No carotid bruit.     Cardiovascular:      Rate and Rhythm: Normal rate and regular rhythm.   Pulmonary:      Effort: Pulmonary effort is normal.      Breath sounds: Normal breath sounds.   Abdominal:      Palpations: Abdomen is soft. There is no mass.      Tenderness: There is no abdominal tenderness.     Musculoskeletal:      Cervical back: Neck supple.   Lymphadenopathy:      Cervical: No cervical adenopathy.     Skin:     General: Skin is warm and dry.     Neurological:      Mental Status: She is alert.     Psychiatric:         Mood and Affect: Mood normal.         Behavior: Behavior normal.         Assessment/Plan   Diagnoses and all orders for this visit:  EVANGELINA (generalized anxiety disorder)  -     busPIRone (Buspar) 5 mg tablet; Take 1 tablet (5 mg) by mouth 2 times a day.  Myocardial infarction with nonobstructive coronary arteries (Multi)  Other orders  -     Follow Up In Primary Care; Future       Reviewed hospital and cardiology records.  Counseled patient.  Start Rx buspirone 5 mg twice daily and once she starts to get  more cardiac answers over this next several weeks that she may be able to reduce this dose and possibly even just use as needed, but explained it is fine for her to continue taking it daily if needed.  Continue citalopram and trazodone.   Return to ER immediately if develops any chest pains or shortness of breath.  Follow-up with cardiologist later this week as scheduled.  Follow-up in 1 month for recheck of anxiety.

## 2025-07-31 ENCOUNTER — APPOINTMENT (OUTPATIENT)
Dept: CARDIOLOGY | Facility: HOSPITAL | Age: 45
End: 2025-07-31
Payer: COMMERCIAL

## 2025-07-31 ENCOUNTER — LAB REQUISITION (OUTPATIENT)
Dept: LAB | Facility: HOSPITAL | Age: 45
End: 2025-07-31

## 2025-07-31 ENCOUNTER — OFFICE VISIT (OUTPATIENT)
Dept: CARDIOLOGY | Facility: HOSPITAL | Age: 45
End: 2025-07-31
Payer: COMMERCIAL

## 2025-07-31 ENCOUNTER — LAB (OUTPATIENT)
Dept: LAB | Facility: HOSPITAL | Age: 45
End: 2025-07-31
Payer: COMMERCIAL

## 2025-07-31 ENCOUNTER — APPOINTMENT (OUTPATIENT)
Dept: LAB | Facility: HOSPITAL | Age: 45
End: 2025-07-31
Payer: COMMERCIAL

## 2025-07-31 ENCOUNTER — TELEPHONE (OUTPATIENT)
Dept: CARDIOLOGY | Facility: HOSPITAL | Age: 45
End: 2025-07-31

## 2025-07-31 VITALS
BODY MASS INDEX: 22.57 KG/M2 | WEIGHT: 132.2 LBS | OXYGEN SATURATION: 100 % | SYSTOLIC BLOOD PRESSURE: 105 MMHG | HEART RATE: 92 BPM | DIASTOLIC BLOOD PRESSURE: 68 MMHG | HEIGHT: 64 IN

## 2025-07-31 DIAGNOSIS — I22.2 SUBSEQUENT NON-ST ELEVATION (NSTEMI) MYOCARDIAL INFARCTION: ICD-10-CM

## 2025-07-31 DIAGNOSIS — I21.9 ACUTE MYOCARDIAL INFARCTION, UNSPECIFIED: ICD-10-CM

## 2025-07-31 DIAGNOSIS — M35.00 SJOGREN'S SYNDROME, WITH UNSPECIFIED ORGAN INVOLVEMENT (MULTI): Primary | ICD-10-CM

## 2025-07-31 DIAGNOSIS — I21.9 MYOCARDIAL INFARCTION WITH NONOBSTRUCTIVE CORONARY ARTERIES (MULTI): ICD-10-CM

## 2025-07-31 PROBLEM — D68.51 HETEROZYGOUS FACTOR V LEIDEN MUTATION (MULTI): Status: ACTIVE | Noted: 2025-07-31

## 2025-07-31 LAB — CARDIAC TROPONIN I PNL SERPL HS: 3 NG/L (ref 0–34)

## 2025-07-31 PROCEDURE — 3008F BODY MASS INDEX DOCD: CPT | Performed by: INTERNAL MEDICINE

## 2025-07-31 PROCEDURE — 93005 ELECTROCARDIOGRAM TRACING: CPT | Performed by: INTERNAL MEDICINE

## 2025-07-31 PROCEDURE — 99215 OFFICE O/P EST HI 40 MIN: CPT | Performed by: INTERNAL MEDICINE

## 2025-07-31 PROCEDURE — 93010 ELECTROCARDIOGRAM REPORT: CPT | Performed by: INTERNAL MEDICINE

## 2025-07-31 PROCEDURE — 84484 ASSAY OF TROPONIN QUANT: CPT

## 2025-07-31 PROCEDURE — 99202 OFFICE O/P NEW SF 15 MIN: CPT | Mod: 25

## 2025-07-31 RX ORDER — ROSUVASTATIN CALCIUM 40 MG/1
40 TABLET, COATED ORAL DAILY
COMMUNITY

## 2025-07-31 ASSESSMENT — PATIENT HEALTH QUESTIONNAIRE - PHQ9
1. LITTLE INTEREST OR PLEASURE IN DOING THINGS: NOT AT ALL
2. FEELING DOWN, DEPRESSED OR HOPELESS: NOT AT ALL
SUM OF ALL RESPONSES TO PHQ9 QUESTIONS 1 AND 2: 0

## 2025-07-31 ASSESSMENT — PAIN SCALES - GENERAL: PAINLEVEL_OUTOF10: 0-NO PAIN

## 2025-07-31 ASSESSMENT — COLUMBIA-SUICIDE SEVERITY RATING SCALE - C-SSRS
1. IN THE PAST MONTH, HAVE YOU WISHED YOU WERE DEAD OR WISHED YOU COULD GO TO SLEEP AND NOT WAKE UP?: NO
2. HAVE YOU ACTUALLY HAD ANY THOUGHTS OF KILLING YOURSELF?: NO
6. HAVE YOU EVER DONE ANYTHING, STARTED TO DO ANYTHING, OR PREPARED TO DO ANYTHING TO END YOUR LIFE?: NO

## 2025-07-31 ASSESSMENT — ENCOUNTER SYMPTOMS
DEPRESSION: 0
LOSS OF SENSATION IN FEET: 0
OCCASIONAL FEELINGS OF UNSTEADINESS: 0

## 2025-07-31 NOTE — LETTER
"July 31, 2025     Ramon Gamboa MD  [id number]  [facility] [id number]    Patient: Tara L Berardinelli   YOB: 1980   Date of Visit: 7/31/2025       Dear Dr. Ramon Gamboa MD:    Thank you for referring Tara Berardinelli to me for evaluation. Below are my notes for this consultation.  If you have questions, please do not hesitate to call me. I look forward to following your patient along with you.       Sincerely,     Orquidea Patterson MD      CC: Steven Brown PA-C  ______________________________________________________________________________________    07/31/25  12:56 PM    Cardiovascular Medicine - FMD/Arterial Dissection Program    This 44 y.o. woman is referred by Dr. Adonay Gamboa for possible SCAD.    She is accompanied by her  today.      She has a longtime of Sjogren's syndrome, but this has been well controlled on Plaquenil, though she does have some fatigue and joint pains intermittently.    She has no known congenital heart disease, vascular disease, or HTN.    She has had much stress at home over the summer. She has chronic anxiety, but has also had stress at home with conflict with her adult son.  ON day prior to her MI event, she had moved her son to a new apartment/house -- was lifting/carrying boxes; she then went to help at a graduation part and was lifting large trays of food up an incline.    On the morning of 7.14 she woke up and felt \"off\", ? Hung over. She vomited and had sudden onset bowel movement. She became drenched in sweat and had left arm pain (?a also back/shoulder/chest) -- she felt like she was having a heart attack and was taken to [facility].  Initial ECG reported as negative. Arm/shoulder pain gone within 1 hour of sx, but she had rise in troponin and underwent coronary angiography that afternoon which found a lesion in her D1 branch. Lv gram with anterior RWMA, but echo prior to discharge reported as normal. She was discharged on 7.15.2025 on " "aspirin + clopidogrel + metoprolol XL 25 mg QD + rosuvastatin 40 mg QD. Peak troponin was [phone].    She has not had pain since hospital discharge for past 16 days. She has had some GERD/reflux sensation in her throat which was different than her MI symptoms.    She is here to see mechanism of her heart attack. Was diagnosed with MI with normal coronary arteries/MINOCA vs. Takotsubo cardiomyopathy.    She has Sjogren's as above  No prior vascular events  No chronic headaches  ? Pulsatile tinnitus a few times in her life  No HTN  Has had borderline triglyceride readings in the past    +Raynaud's in past. Anxiety is a struggle; her PCP recently added Buspar.    Medical History[1]    Current Medications[2]      Surgical History[3]    Family hx + for father with Factor V Leiden; she has factor V Leiden with no prior DVT    Current Medications[4]    Allergies[5]    Tobacco Use: Medium Risk (7/31/2025)    Patient History    • Smoking Tobacco Use: Former    • Smokeless Tobacco Use: Never    • Passive Exposure: Not on file     On examination:  Patient Vitals for the past 24 hrs:   BP Pulse SpO2 Height Weight   07/31/25 1112 105/68 92 100 % 1.626 m (5' 4\") 60 kg (132 lb 3.2 oz)   She is in no distress but is anxious in re: her diagnosis  HEENT no [employer]  JVP flat, no cervical bruits  +S1, S2, RRR; no m/r/g  Clear lungs  Abd soft, benign  No femoral bruits  Brisk LE pulses  Good radial pulses; extensive bruising right forearm at site of radial cath but normal radial pulse and no palpable hematoma/PSA  NO edema, ulcers, rashes, chronic venous disease  She was alert and oriented, fluid speech    Today's ECG  NSR at 82 beats per minute  Normal intervals/axis  T wave inversions/abnormality in V1-V3/V4    7.15.2025 Lipids  Tchol 80  HDL 28  LDL 17 mg/dL  Trig 177 mg/dL    7.14-7.15 Labs  K 4.3, Cr 0.9  AST/ALT 53/33  ESR 4  CRP 1.4 mg/L  HgB 13.4   K  WBC 10.5    7.14.2025 Echo (report only)  Normal LVEF, no RWMA " reported  •  Left Ventricle: Left ventricle size is normal. Normal wall thickness.   Normal left ventricular systolic function. The EF by visual approximation   is 65%. Global longitudinal strain is -18.4%. Normal wall motion.   •  Right Ventricle: Right ventricle size is normal. Normal systolic   function.   • No significant valvular abnormalities.   •  Pericardium: No pericardial effusion.     7.14.2025 coronary angiogram reviewed  She has a non dominant LCx proper that tapers in its AV grove portion  Tortuous diagonal branch  She has diffuse narrowing of a Dx branch with a lesion at the ostium; unfortunately no TNG given or intracoronary imaging    Anterior RWMA        Assessment/Plan: 44 y.o. woman with longstanding but well controlled Sjogren's disease with no prior cardiac hx with NSTEMI after a day of emotional and physical stress.  She has been dx with MINOCA vs. Takotsubo. On review of her angiograms, I am concerned in re: the diagonal branch lesions and that this could have been SCAD (vs. Vasospasm). No TNG or IC imaging done. She has evolving ECG with anterior T wave inversions though she is chest pain free (has some reflux in throat that is different from her MI sx).    At this time, given the ECG changes, I will cycle troponin to be certain to re elevation. If she does have re elevation, will need admission and repeat angiography, likely CTA for extension.    Will continue DAPT for now, B Blocker at same dose; I would stop Plavix at 1 month candace if no evidence of re infarction. Repeat lipids at some point and can  like reduce statin dose (last LDL < 20 mg/dL).    I will refer to cardiac rehab once we are certain no progression here.    She needs confirmation of MI and work up of MINOCA with cardiac MRI -- I will arrange; evidence of infarction in diagonal territory will be confirmatory of likely SCAD.    To complete work up, I will check Lp(a), anti PL antibodies. I don't think her HZ Factor V Leiden (a  "\"weak\" thrombophilia associated with VTE not arterial thromobosis) is contributory.    She needs imaging for underlying coronary FMD-- I will order head to pelvis CTA.    Management of emotional stress (anxiety) will be important and would reduce heavy lifting to nothing > 35#.    I discussed the above with Ms. Berardinelli and her  including some uncertainty in terms of dx -- SCAD vs. Spasm vs. Takotsubo event, though I suspect SCAD. Diagnostic work up with address all possibilities and management is similar for all 3.    I answered her excellent questions and those of her .    I will be back in touch after troponin x 2 today.    I spent > 80 minutes in today's visit including fact to face time and review of materials from [facility].    Orquidea Patterson MD           [1]  Past Medical History:  Diagnosis Date   • Anxiety    • Depression    • Fibrocystic breast 2019   • IBS (irritable bowel syndrome)    • Myocardial infarction with nonobstructive coronary arteries (Multi) 07/2025   • Raynaud disease    • Sjogren syndrome, unspecified (Multi)     History of Sjogren's disease   [2]  Current Outpatient Medications   Medication Sig Dispense Refill   • ALPRAZolam (Xanax) 0.5 mg tablet Take 1 tablet (0.5 mg) by mouth every 12 hours.     • aluminum chloride (Drysol) 20 % external solution APPLY TOPICALLY TWICE A WEEK AT BEDTIME AS DIRECTED 37.5 mL 1   • aspirin 81 mg EC tablet Take 1 tablet (81 mg) by mouth once daily.     • busPIRone (Buspar) 5 mg tablet Take 1 tablet (5 mg) by mouth 2 times a day. 60 tablet 0   • citalopram (CeleXA) 40 mg tablet Take 1 tablet (40 mg) by mouth once daily. 90 tablet 1   • clopidogrel (Plavix) 75 mg tablet Take 1 tablet (75 mg) by mouth once daily.     • dicyclomine (Bentyl) 20 mg tablet Take 1 tablet (20 mg) by mouth 2 times a day as needed (IBS symptoms). 60 tablet 1   • hydroxychloroquine (Plaquenil) 200 mg tablet once every 24 hours.     • levonorgestrel (Mirena) 21 mcg/24 " hours (8 yrs) 52 mg IUD      • metoprolol succinate XL (Toprol-XL) 25 mg 24 hr tablet Take 1 tablet (25 mg) by mouth once daily.     • rosuvastatin (Crestor) 40 mg tablet Take 1 tablet (40 mg) by mouth once daily.     • spironolactone (Aldactone) 50 mg tablet Take 1 tablet in the AM for 2 weeks, then increase to 2 tablets in the AM daily. 180 tablet 2   • traZODone (Desyrel) 50 mg tablet Take 1 tablet (50 mg) by mouth as needed at bedtime for sleep. 90 tablet 1   • tretinoin (Retin-A) 0.025 % cream Apply a pea-size amount to entire face at bedtime. Decrease use if too drying. 45 g 5     No current facility-administered medications for this visit.   [3]  Past Surgical History:  Procedure Laterality Date   • BREAST BIOPSY  9/2024   • OTHER SURGICAL HISTORY  11/22/2019    Hernia repair   • OTHER SURGICAL HISTORY  11/22/2019    Tonsillectomy   • REDUCTION MAMMAPLASTY  9/24   [4]  Current Outpatient Medications   Medication Sig Dispense Refill   • ALPRAZolam (Xanax) 0.5 mg tablet Take 1 tablet (0.5 mg) by mouth every 12 hours.     • aluminum chloride (Drysol) 20 % external solution APPLY TOPICALLY TWICE A WEEK AT BEDTIME AS DIRECTED 37.5 mL 1   • aspirin 81 mg EC tablet Take 1 tablet (81 mg) by mouth once daily.     • busPIRone (Buspar) 5 mg tablet Take 1 tablet (5 mg) by mouth 2 times a day. 60 tablet 0   • citalopram (CeleXA) 40 mg tablet Take 1 tablet (40 mg) by mouth once daily. 90 tablet 1   • clopidogrel (Plavix) 75 mg tablet Take 1 tablet (75 mg) by mouth once daily.     • dicyclomine (Bentyl) 20 mg tablet Take 1 tablet (20 mg) by mouth 2 times a day as needed (IBS symptoms). 60 tablet 1   • hydroxychloroquine (Plaquenil) 200 mg tablet once every 24 hours.     • levonorgestrel (Mirena) 21 mcg/24 hours (8 yrs) 52 mg IUD      • metoprolol succinate XL (Toprol-XL) 25 mg 24 hr tablet Take 1 tablet (25 mg) by mouth once daily.     • rosuvastatin (Crestor) 40 mg tablet Take 1 tablet (40 mg) by mouth once daily.     •  spironolactone (Aldactone) 50 mg tablet Take 1 tablet in the AM for 2 weeks, then increase to 2 tablets in the AM daily. 180 tablet 2   • traZODone (Desyrel) 50 mg tablet Take 1 tablet (50 mg) by mouth as needed at bedtime for sleep. 90 tablet 1   • tretinoin (Retin-A) 0.025 % cream Apply a pea-size amount to entire face at bedtime. Decrease use if too drying. 45 g 5     No current facility-administered medications for this visit.   [5]  No Known Allergies       [1]  Past Medical History:  Diagnosis Date   • Anxiety    • Depression    • Fibrocystic breast 2019   • IBS (irritable bowel syndrome)    • Myocardial infarction with nonobstructive coronary arteries (Multi) 07/2025   • Raynaud disease    • Sjogren syndrome, unspecified (Multi)     History of Sjogren's disease   [2]  Current Outpatient Medications   Medication Sig Dispense Refill   • ALPRAZolam (Xanax) 0.5 mg tablet Take 1 tablet (0.5 mg) by mouth every 12 hours.     • aluminum chloride (Drysol) 20 % external solution APPLY TOPICALLY TWICE A WEEK AT BEDTIME AS DIRECTED 37.5 mL 1   • aspirin 81 mg EC tablet Take 1 tablet (81 mg) by mouth once daily.     • busPIRone (Buspar) 5 mg tablet Take 1 tablet (5 mg) by mouth 2 times a day. 60 tablet 0   • citalopram (CeleXA) 40 mg tablet Take 1 tablet (40 mg) by mouth once daily. 90 tablet 1   • clopidogrel (Plavix) 75 mg tablet Take 1 tablet (75 mg) by mouth once daily.     • dicyclomine (Bentyl) 20 mg tablet Take 1 tablet (20 mg) by mouth 2 times a day as needed (IBS symptoms). 60 tablet 1   • hydroxychloroquine (Plaquenil) 200 mg tablet once every 24 hours.     • levonorgestrel (Mirena) 21 mcg/24 hours (8 yrs) 52 mg IUD      • metoprolol succinate XL (Toprol-XL) 25 mg 24 hr tablet Take 1 tablet (25 mg) by mouth once daily.     • rosuvastatin (Crestor) 40 mg tablet Take 1 tablet (40 mg) by mouth once daily.     • spironolactone (Aldactone) 50 mg tablet Take 1 tablet in the AM for 2 weeks, then increase to 2 tablets  in the AM daily. 180 tablet 2   • traZODone (Desyrel) 50 mg tablet Take 1 tablet (50 mg) by mouth as needed at bedtime for sleep. 90 tablet 1   • tretinoin (Retin-A) 0.025 % cream Apply a pea-size amount to entire face at bedtime. Decrease use if too drying. 45 g 5     No current facility-administered medications for this visit.   [3]  Past Surgical History:  Procedure Laterality Date   • BREAST BIOPSY  9/2024   • OTHER SURGICAL HISTORY  11/22/2019    Hernia repair   • OTHER SURGICAL HISTORY  11/22/2019    Tonsillectomy   • REDUCTION MAMMAPLASTY  9/24   [4]  Current Outpatient Medications   Medication Sig Dispense Refill   • ALPRAZolam (Xanax) 0.5 mg tablet Take 1 tablet (0.5 mg) by mouth every 12 hours.     • aluminum chloride (Drysol) 20 % external solution APPLY TOPICALLY TWICE A WEEK AT BEDTIME AS DIRECTED 37.5 mL 1   • aspirin 81 mg EC tablet Take 1 tablet (81 mg) by mouth once daily.     • busPIRone (Buspar) 5 mg tablet Take 1 tablet (5 mg) by mouth 2 times a day. 60 tablet 0   • citalopram (CeleXA) 40 mg tablet Take 1 tablet (40 mg) by mouth once daily. 90 tablet 1   • clopidogrel (Plavix) 75 mg tablet Take 1 tablet (75 mg) by mouth once daily.     • dicyclomine (Bentyl) 20 mg tablet Take 1 tablet (20 mg) by mouth 2 times a day as needed (IBS symptoms). 60 tablet 1   • hydroxychloroquine (Plaquenil) 200 mg tablet once every 24 hours.     • levonorgestrel (Mirena) 21 mcg/24 hours (8 yrs) 52 mg IUD      • metoprolol succinate XL (Toprol-XL) 25 mg 24 hr tablet Take 1 tablet (25 mg) by mouth once daily.     • rosuvastatin (Crestor) 40 mg tablet Take 1 tablet (40 mg) by mouth once daily.     • spironolactone (Aldactone) 50 mg tablet Take 1 tablet in the AM for 2 weeks, then increase to 2 tablets in the AM daily. 180 tablet 2   • traZODone (Desyrel) 50 mg tablet Take 1 tablet (50 mg) by mouth as needed at bedtime for sleep. 90 tablet 1   • tretinoin (Retin-A) 0.025 % cream Apply a pea-size amount to entire face at  bedtime. Decrease use if too drying. 45 g 5     No current facility-administered medications for this visit.   [5]  No Known Allergies

## 2025-07-31 NOTE — TELEPHONE ENCOUNTER
Spoke to pt about negative x2 Troponin's and she can now go home.  She expressed understanding.  Asked her to reach out to us with any questions or concerns.

## 2025-07-31 NOTE — PATIENT INSTRUCTIONS
Nice to meet you today Ms. Berardinelli.  Meds:  Continue Plavix 75mg for a total of 4 weeks, then stop.  Continue Aspirin 81mg once daily.  Continue Metoprolol 25mg once daily.  Continue Rosuvastatin 40mg once daily for now.  Other meds prescribed - continue as prescribed.   Plan:  Ordered MRI coronary arteries.  Ordered blood work.  We will reach out with results.    See you back on 9/4 @ 11:30am with CTA head/pelvis prior same day.     Orquidea Patterson MD  Co-Director, Vascular Center  [facility]   [facility] Master Clinician in Fibromuscular Dysplasia and Vascular Care  Professor of Medicine  [facility]

## 2025-07-31 NOTE — PROGRESS NOTES
"07/31/25  12:56 PM    Cardiovascular Medicine - FMD/Arterial Dissection Program    This 44 y.o. woman is referred by Dr. Adonay Gamboa for possible SCAD.    She is accompanied by her  today.      She has a longtime of Sjogren's syndrome, but this has been well controlled on Plaquenil, though she does have some fatigue and joint pains intermittently.    She has no known congenital heart disease, vascular disease, or HTN.    She has had much stress at home over the summer. She has chronic anxiety, but has also had stress at home with conflict with her adult son.  ON day prior to her MI event, she had moved her son to a new apartment/house -- was lifting/carrying boxes; she then went to help at a graduation part and was lifting large trays of food up an incline.    On the morning of 7.14 she woke up and felt \"off\", ? Hung over. She vomited and had sudden onset bowel movement. She became drenched in sweat and had left arm pain (?a also back/shoulder/chest) -- she felt like she was having a heart attack and was taken to [facility].  Initial ECG reported as negative. Arm/shoulder pain gone within 1 hour of sx, but she had rise in troponin and underwent coronary angiography that afternoon which found a lesion in her D1 branch. Lv gram with anterior RWMA, but echo prior to discharge reported as normal. She was discharged on 7.15.2025 on aspirin + clopidogrel + metoprolol XL 25 mg QD + rosuvastatin 40 mg QD. Peak troponin was [phone].    She has not had pain since hospital discharge for past 16 days. She has had some GERD/reflux sensation in her throat which was different than her MI symptoms.    She is here to see mechanism of her heart attack. Was diagnosed with MI with normal coronary arteries/MINOCA vs. Takotsubo cardiomyopathy.    She has Sjogren's as above  No prior vascular events  No chronic headaches  ? Pulsatile tinnitus a few times in her life  No HTN  Has had borderline triglyceride readings in the " "past    +Raynaud's in past. Anxiety is a struggle; her PCP recently added Buspar.    Medical History[1]    Current Medications[2]      Surgical History[3]    Family hx + for father with Factor V Leiden; she has factor V Leiden with no prior DVT    Current Medications[4]    Allergies[5]    Tobacco Use: Medium Risk (7/31/2025)    Patient History     Smoking Tobacco Use: Former     Smokeless Tobacco Use: Never     Passive Exposure: Not on file     On examination:  Patient Vitals for the past 24 hrs:   BP Pulse SpO2 Height Weight   07/31/25 1112 105/68 92 100 % 1.626 m (5' 4\") 60 kg (132 lb 3.2 oz)   She is in no distress but is anxious in re: her diagnosis  HEENT no [employer]  JVP flat, no cervical bruits  +S1, S2, RRR; no m/r/g  Clear lungs  Abd soft, benign  No femoral bruits  Brisk LE pulses  Good radial pulses; extensive bruising right forearm at site of radial cath but normal radial pulse and no palpable hematoma/PSA  NO edema, ulcers, rashes, chronic venous disease  She was alert and oriented, fluid speech    Today's ECG  NSR at 82 beats per minute  Normal intervals/axis  T wave inversions/abnormality in V1-V3/V4    7.15.2025 Lipids  Tchol 80  HDL 28  LDL 17 mg/dL  Trig 177 mg/dL    7.14-7.15 Labs  K 4.3, Cr 0.9  AST/ALT 53/33  ESR 4  CRP 1.4 mg/L  HgB 13.4   K  WBC 10.5    7.14.2025 Echo (report only)  Normal LVEF, no RWMA reported    Left Ventricle: Left ventricle size is normal. Normal wall thickness.   Normal left ventricular systolic function. The EF by visual approximation   is 65%. Global longitudinal strain is -18.4%. Normal wall motion.     Right Ventricle: Right ventricle size is normal. Normal systolic   function.    No significant valvular abnormalities.     Pericardium: No pericardial effusion.     7.14.2025 coronary angiogram reviewed  She has a non dominant LCx proper that tapers in its AV grove portion  Tortuous diagonal branch  She has diffuse narrowing of a Dx branch with a lesion at the " "ostium; unfortunately no TNG given or intracoronary imaging    Anterior RWMA        Assessment/Plan: 44 y.o. woman with longstanding but well controlled Sjogren's disease with no prior cardiac hx with NSTEMI after a day of emotional and physical stress.  She has been dx with MINOCA vs. Takotsubo. On review of her angiograms, I am concerned in re: the diagonal branch lesions and that this could have been SCAD (vs. Vasospasm). No TNG or IC imaging done. She has evolving ECG with anterior T wave inversions though she is chest pain free (has some reflux in throat that is different from her MI sx).    At this time, given the ECG changes, I will cycle troponin to be certain to re elevation. If she does have re elevation, will need admission and repeat angiography, likely CTA for extension.    Will continue DAPT for now, B Blocker at same dose; I would stop Plavix at 1 month candace if no evidence of re infarction. Repeat lipids at some point and can  like reduce statin dose (last LDL < 20 mg/dL).    I will refer to cardiac rehab once we are certain no progression here.    She needs confirmation of MI and work up of MINOCA with cardiac MRI -- I will arrange; evidence of infarction in diagonal territory will be confirmatory of likely SCAD.    To complete work up, I will check Lp(a), anti PL antibodies. I don't think her HZ Factor V Leiden (a \"weak\" thrombophilia associated with VTE not arterial thromobosis) is contributory.    She needs imaging for underlying coronary FMD-- I will order head to pelvis CTA.    Management of emotional stress (anxiety) will be important and would reduce heavy lifting to nothing > 35#.    I discussed the above with Ms. Berardinelli and her  including some uncertainty in terms of dx -- SCAD vs. spasm vs. Takotsubo event, though I suspect SCAD. Diagnostic work up with address all possibilities and management is similar for all 3.    I answered her excellent questions and those of her " .    I will be back in touch after troponin x 2 today.    I spent > 80 minutes in today's visit including fact to face time and review of materials from [facility].    Orquidea Patterson MD      07/31/25  4:39 PM    Good news; troponin x 2 spaced hours apart < assay.  ECG just reflects changes from her initial event, likely diagonal branch SCAD. No reinfarction or extension.   Await cardiac MRI, head to pelvis CTA, and will consider coronary CTA down the road (for normalization of diagonal branch size). OK to stop clopidogrel and be on aspirin alone at 4 week candace post initial event, continue metoprolol.    Orquidea Patterson MD           [1]   Past Medical History:  Diagnosis Date    Anxiety     Depression     Fibrocystic breast 2019    IBS (irritable bowel syndrome)     Myocardial infarction with nonobstructive coronary arteries (Multi) 07/2025    Raynaud disease     Sjogren syndrome, unspecified (Multi)     History of Sjogren's disease   [2]   Current Outpatient Medications   Medication Sig Dispense Refill    ALPRAZolam (Xanax) 0.5 mg tablet Take 1 tablet (0.5 mg) by mouth every 12 hours.      aluminum chloride (Drysol) 20 % external solution APPLY TOPICALLY TWICE A WEEK AT BEDTIME AS DIRECTED 37.5 mL 1    aspirin 81 mg EC tablet Take 1 tablet (81 mg) by mouth once daily.      busPIRone (Buspar) 5 mg tablet Take 1 tablet (5 mg) by mouth 2 times a day. 60 tablet 0    citalopram (CeleXA) 40 mg tablet Take 1 tablet (40 mg) by mouth once daily. 90 tablet 1    clopidogrel (Plavix) 75 mg tablet Take 1 tablet (75 mg) by mouth once daily.      dicyclomine (Bentyl) 20 mg tablet Take 1 tablet (20 mg) by mouth 2 times a day as needed (IBS symptoms). 60 tablet 1    hydroxychloroquine (Plaquenil) 200 mg tablet once every 24 hours.      levonorgestrel (Mirena) 21 mcg/24 hours (8 yrs) 52 mg IUD       metoprolol succinate XL (Toprol-XL) 25 mg 24 hr tablet Take 1 tablet (25 mg) by mouth once daily.      rosuvastatin (Crestor) 40  mg tablet Take 1 tablet (40 mg) by mouth once daily.      spironolactone (Aldactone) 50 mg tablet Take 1 tablet in the AM for 2 weeks, then increase to 2 tablets in the AM daily. 180 tablet 2    traZODone (Desyrel) 50 mg tablet Take 1 tablet (50 mg) by mouth as needed at bedtime for sleep. 90 tablet 1    tretinoin (Retin-A) 0.025 % cream Apply a pea-size amount to entire face at bedtime. Decrease use if too drying. 45 g 5     No current facility-administered medications for this visit.   [3]   Past Surgical History:  Procedure Laterality Date    BREAST BIOPSY  9/2024    OTHER SURGICAL HISTORY  11/22/2019    Hernia repair    OTHER SURGICAL HISTORY  11/22/2019    Tonsillectomy    REDUCTION MAMMAPLASTY  9/24   [4]   Current Outpatient Medications   Medication Sig Dispense Refill    ALPRAZolam (Xanax) 0.5 mg tablet Take 1 tablet (0.5 mg) by mouth every 12 hours.      aluminum chloride (Drysol) 20 % external solution APPLY TOPICALLY TWICE A WEEK AT BEDTIME AS DIRECTED 37.5 mL 1    aspirin 81 mg EC tablet Take 1 tablet (81 mg) by mouth once daily.      busPIRone (Buspar) 5 mg tablet Take 1 tablet (5 mg) by mouth 2 times a day. 60 tablet 0    citalopram (CeleXA) 40 mg tablet Take 1 tablet (40 mg) by mouth once daily. 90 tablet 1    clopidogrel (Plavix) 75 mg tablet Take 1 tablet (75 mg) by mouth once daily.      dicyclomine (Bentyl) 20 mg tablet Take 1 tablet (20 mg) by mouth 2 times a day as needed (IBS symptoms). 60 tablet 1    hydroxychloroquine (Plaquenil) 200 mg tablet once every 24 hours.      levonorgestrel (Mirena) 21 mcg/24 hours (8 yrs) 52 mg IUD       metoprolol succinate XL (Toprol-XL) 25 mg 24 hr tablet Take 1 tablet (25 mg) by mouth once daily.      rosuvastatin (Crestor) 40 mg tablet Take 1 tablet (40 mg) by mouth once daily.      spironolactone (Aldactone) 50 mg tablet Take 1 tablet in the AM for 2 weeks, then increase to 2 tablets in the AM daily. 180 tablet 2    traZODone (Desyrel) 50 mg tablet Take 1  tablet (50 mg) by mouth as needed at bedtime for sleep. 90 tablet 1    tretinoin (Retin-A) 0.025 % cream Apply a pea-size amount to entire face at bedtime. Decrease use if too drying. 45 g 5     No current facility-administered medications for this visit.   [5] No Known Allergies

## 2025-08-01 ENCOUNTER — PATIENT OUTREACH (OUTPATIENT)
Dept: PRIMARY CARE | Facility: CLINIC | Age: 45
End: 2025-08-01
Payer: COMMERCIAL

## 2025-08-01 NOTE — PROGRESS NOTES
Unable to reach patient for follow up call after recent hospitalization.   Left voicemail with call back number for patient to call if needed  to assist with any questions or concerns patient may have.    Office Visit with Steven Brown PA-C (07/28/2025)

## 2025-08-02 LAB
ATRIAL RATE: 82 BPM
P AXIS: 72 DEGREES
P OFFSET: 199 MS
P ONSET: 155 MS
PR INTERVAL: 132 MS
Q ONSET: 221 MS
QRS COUNT: 13 BEATS
QRS DURATION: 78 MS
QT INTERVAL: 386 MS
QTC CALCULATION(BAZETT): 450 MS
QTC FREDERICIA: 428 MS
R AXIS: 98 DEGREES
T AXIS: 94 DEGREES
T OFFSET: 414 MS
VENTRICULAR RATE: 82 BPM

## 2025-08-03 LAB
B2 GLYCOPROT1 IGA SER-ACNC: 3.8 U/ML
B2 GLYCOPROT1 IGG SER-ACNC: <2 U/ML
B2 GLYCOPROT1 IGM SER-ACNC: 5.7 U/ML
CARDIOLIPIN IGA SER IA-ACNC: 6.6 APL-U/ML
CARDIOLIPIN IGG SER IA-ACNC: <2 GPL-U/ML
CARDIOLIPIN IGM SER IA-ACNC: 6.7 MPL-U/ML
LA 2 SCREEN W REFLEX-IMP: NORMAL
LPA SERPL-SCNC: NORMAL NMOL/L
SCREEN APTT: NORMAL S
SCREEN DRVVT: NORMAL S

## 2025-08-04 DIAGNOSIS — M35.9 CONNECTIVE TISSUE DISORDER (MULTI): ICD-10-CM

## 2025-08-04 DIAGNOSIS — Z86.79 HISTORY OF ARTERIAL DISSECTION: ICD-10-CM

## 2025-08-06 ENCOUNTER — APPOINTMENT (OUTPATIENT)
Dept: RADIOLOGY | Facility: HOSPITAL | Age: 45
End: 2025-08-06
Payer: COMMERCIAL

## 2025-08-07 LAB
B2 GLYCOPROT1 IGA SER-ACNC: 3.8 U/ML
B2 GLYCOPROT1 IGG SER-ACNC: <2 U/ML
B2 GLYCOPROT1 IGM SER-ACNC: 5.7 U/ML
CARDIOLIPIN IGA SER IA-ACNC: 6.6 APL-U/ML
CARDIOLIPIN IGG SER IA-ACNC: <2 GPL-U/ML
CARDIOLIPIN IGM SER IA-ACNC: 6.7 MPL-U/ML
LA 2 SCREEN W REFLEX-IMP: NORMAL
LPA SERPL-SCNC: <10 NMOL/L
SCREEN APTT: 38 SEC
SCREEN DRVVT: 32 SEC

## 2025-08-18 DIAGNOSIS — I21.9 MYOCARDIAL INFARCTION WITH NONOBSTRUCTIVE CORONARY ARTERIES (MULTI): ICD-10-CM

## 2025-08-18 DIAGNOSIS — L70.0 ACNE VULGARIS: ICD-10-CM

## 2025-08-18 RX ORDER — METOPROLOL SUCCINATE 25 MG/1
25 TABLET, EXTENDED RELEASE ORAL DAILY
Qty: 90 TABLET | Refills: 3 | Status: SHIPPED | OUTPATIENT
Start: 2025-08-18 | End: 2026-08-18

## 2025-08-18 RX ORDER — CLOPIDOGREL BISULFATE 75 MG/1
75 TABLET ORAL DAILY
Qty: 30 TABLET | Refills: 0 | Status: SHIPPED | OUTPATIENT
Start: 2025-08-18 | End: 2025-08-29 | Stop reason: WASHOUT

## 2025-08-18 RX ORDER — ROSUVASTATIN CALCIUM 40 MG/1
40 TABLET, COATED ORAL DAILY
Qty: 30 TABLET | Refills: 5 | Status: SHIPPED | OUTPATIENT
Start: 2025-08-18 | End: 2026-02-14

## 2025-08-20 ENCOUNTER — APPOINTMENT (OUTPATIENT)
Dept: DERMATOLOGY | Facility: CLINIC | Age: 45
End: 2025-08-20
Payer: COMMERCIAL

## 2025-08-20 DIAGNOSIS — Z12.83 ENCOUNTER FOR SCREENING FOR MALIGNANT NEOPLASM OF SKIN: ICD-10-CM

## 2025-08-20 DIAGNOSIS — D22.9 MELANOCYTIC NEVUS, UNSPECIFIED LOCATION: ICD-10-CM

## 2025-08-20 DIAGNOSIS — L81.4 LENTIGO: ICD-10-CM

## 2025-08-20 DIAGNOSIS — L21.9 SEBORRHEIC DERMATITIS: Primary | ICD-10-CM

## 2025-08-20 DIAGNOSIS — Z12.83 SKIN CANCER SCREENING: ICD-10-CM

## 2025-08-20 DIAGNOSIS — L70.0 ACNE VULGARIS: ICD-10-CM

## 2025-08-20 PROCEDURE — 99214 OFFICE O/P EST MOD 30 MIN: CPT | Performed by: NURSE PRACTITIONER

## 2025-08-20 RX ORDER — KETOCONAZOLE 20 MG/G
CREAM TOPICAL
Qty: 30 G | Refills: 11 | Status: SHIPPED | OUTPATIENT
Start: 2025-08-20

## 2025-08-20 RX ORDER — TRETINOIN 0.25 MG/G
CREAM TOPICAL
Qty: 45 G | Refills: 7 | Status: SHIPPED | OUTPATIENT
Start: 2025-08-20

## 2025-08-20 RX ORDER — SPIRONOLACTONE 100 MG/1
100 TABLET, FILM COATED ORAL DAILY
Qty: 30 TABLET | Refills: 11 | Status: SHIPPED | OUTPATIENT
Start: 2025-08-20 | End: 2026-08-20

## 2025-08-20 RX ORDER — HYDROCORTISONE 25 MG/G
CREAM TOPICAL
Qty: 60 G | Refills: 0 | Status: SHIPPED | OUTPATIENT
Start: 2025-08-20

## 2025-08-21 LAB
BUN SERPL-MCNC: 11 MG/DL (ref 7–25)
CREAT SERPL-MCNC: 1.06 MG/DL (ref 0.5–0.99)
EGFRCR SERPLBLD CKD-EPI 2021: 67 ML/MIN/1.73M2

## 2025-08-27 ENCOUNTER — HOSPITAL ENCOUNTER (OUTPATIENT)
Dept: RADIOLOGY | Facility: CLINIC | Age: 45
Discharge: HOME | End: 2025-08-27
Payer: COMMERCIAL

## 2025-08-27 DIAGNOSIS — I22.2 SUBSEQUENT NON-ST ELEVATION (NSTEMI) MYOCARDIAL INFARCTION: ICD-10-CM

## 2025-08-27 PROCEDURE — A9575 INJ GADOTERATE MEGLUMI 0.1ML: HCPCS | Performed by: INTERNAL MEDICINE

## 2025-08-27 PROCEDURE — 2550000001 HC RX 255 CONTRASTS: Performed by: INTERNAL MEDICINE

## 2025-08-27 PROCEDURE — 75561 CARDIAC MRI FOR MORPH W/DYE: CPT

## 2025-08-27 RX ORDER — GADOTERATE MEGLUMINE 376.9 MG/ML
24 INJECTION INTRAVENOUS
Status: COMPLETED | OUTPATIENT
Start: 2025-08-27 | End: 2025-08-27

## 2025-08-27 RX ADMIN — GADOTERATE MEGLUMINE 24 ML: 376.9 INJECTION INTRAVENOUS at 10:48

## 2025-08-28 DIAGNOSIS — F41.1 GAD (GENERALIZED ANXIETY DISORDER): ICD-10-CM

## 2025-08-28 RX ORDER — BUSPIRONE HYDROCHLORIDE 5 MG/1
5 TABLET ORAL 2 TIMES DAILY
Qty: 60 TABLET | Refills: 0 | Status: SHIPPED | OUTPATIENT
Start: 2025-08-28 | End: 2025-08-29 | Stop reason: SDUPTHER

## 2025-08-29 ENCOUNTER — APPOINTMENT (OUTPATIENT)
Dept: PRIMARY CARE | Facility: CLINIC | Age: 45
End: 2025-08-29
Payer: COMMERCIAL

## 2025-08-29 ENCOUNTER — HOSPITAL ENCOUNTER (OUTPATIENT)
Dept: RADIOLOGY | Facility: CLINIC | Age: 45
End: 2025-08-29
Payer: COMMERCIAL

## 2025-08-29 ASSESSMENT — ENCOUNTER SYMPTOMS
ABDOMINAL PAIN: 0
SHORTNESS OF BREATH: 0

## 2025-09-04 ENCOUNTER — HOSPITAL ENCOUNTER (OUTPATIENT)
Dept: RADIOLOGY | Facility: HOSPITAL | Age: 45
Discharge: HOME | End: 2025-09-04
Payer: COMMERCIAL

## 2025-09-04 ENCOUNTER — OFFICE VISIT (OUTPATIENT)
Dept: CARDIOLOGY | Facility: HOSPITAL | Age: 45
End: 2025-09-04
Payer: COMMERCIAL

## 2025-09-04 VITALS
BODY MASS INDEX: 22.98 KG/M2 | HEIGHT: 64 IN | HEART RATE: 89 BPM | DIASTOLIC BLOOD PRESSURE: 73 MMHG | WEIGHT: 134.6 LBS | SYSTOLIC BLOOD PRESSURE: 105 MMHG | OXYGEN SATURATION: 100 %

## 2025-09-04 DIAGNOSIS — Z86.79 HISTORY OF ARTERIAL DISSECTION: ICD-10-CM

## 2025-09-04 DIAGNOSIS — M35.9 CONNECTIVE TISSUE DISORDER (MULTI): ICD-10-CM

## 2025-09-04 DIAGNOSIS — I51.81 TAKOTSUBO SYNDROME: ICD-10-CM

## 2025-09-04 DIAGNOSIS — I21.A1 MYOCARDIAL INFARCTION TYPE 2 (MULTI): ICD-10-CM

## 2025-09-04 DIAGNOSIS — I22.2 SUBSEQUENT NON-ST ELEVATION (NSTEMI) MYOCARDIAL INFARCTION: ICD-10-CM

## 2025-09-04 DIAGNOSIS — D68.51 HETEROZYGOUS FACTOR V LEIDEN MUTATION (MULTI): ICD-10-CM

## 2025-09-04 PROCEDURE — 99212 OFFICE O/P EST SF 10 MIN: CPT | Mod: 25

## 2025-09-04 PROCEDURE — 2550000001 HC RX 255 CONTRASTS: Performed by: INTERNAL MEDICINE

## 2025-09-04 PROCEDURE — 70496 CT ANGIOGRAPHY HEAD: CPT

## 2025-09-04 PROCEDURE — 99214 OFFICE O/P EST MOD 30 MIN: CPT | Performed by: INTERNAL MEDICINE

## 2025-09-04 PROCEDURE — 70498 CT ANGIOGRAPHY NECK: CPT | Performed by: RADIOLOGY

## 2025-09-04 PROCEDURE — 1036F TOBACCO NON-USER: CPT | Performed by: INTERNAL MEDICINE

## 2025-09-04 PROCEDURE — 70496 CT ANGIOGRAPHY HEAD: CPT | Performed by: RADIOLOGY

## 2025-09-04 PROCEDURE — 3008F BODY MASS INDEX DOCD: CPT | Performed by: INTERNAL MEDICINE

## 2025-09-04 PROCEDURE — 74174 CTA ABD&PLVS W/CONTRAST: CPT

## 2025-09-04 RX ORDER — ROSUVASTATIN CALCIUM 5 MG/1
5 TABLET, COATED ORAL DAILY
Qty: 90 TABLET | Refills: 3 | Status: SHIPPED | OUTPATIENT
Start: 2025-09-04 | End: 2026-09-04

## 2025-09-04 RX ORDER — ASPIRIN 81 MG/1
81 TABLET ORAL
Qty: 90 TABLET | Refills: 3 | Status: SHIPPED | OUTPATIENT
Start: 2025-09-04 | End: 2026-09-04

## 2025-09-04 RX ADMIN — IOHEXOL 90 ML: 350 INJECTION, SOLUTION INTRAVENOUS at 10:05

## 2025-09-04 ASSESSMENT — PAIN SCALES - GENERAL: PAINLEVEL_OUTOF10: 0-NO PAIN

## 2025-09-05 ENCOUNTER — HOSPITAL ENCOUNTER (OUTPATIENT)
Dept: CARDIOLOGY | Facility: CLINIC | Age: 45
Discharge: HOME | End: 2025-09-05
Payer: COMMERCIAL

## 2025-09-05 LAB
ATRIAL RATE: 84 BPM
P AXIS: 64 DEGREES
P OFFSET: 201 MS
P ONSET: 147 MS
PR INTERVAL: 150 MS
Q ONSET: 222 MS
QRS COUNT: 14 BEATS
QRS DURATION: 86 MS
QT INTERVAL: 386 MS
QTC CALCULATION(BAZETT): 456 MS
QTC FREDERICIA: 431 MS
R AXIS: 80 DEGREES
T AXIS: 70 DEGREES
T OFFSET: 415 MS
VENTRICULAR RATE: 84 BPM

## 2025-09-05 PROCEDURE — 93005 ELECTROCARDIOGRAM TRACING: CPT

## 2025-09-10 ENCOUNTER — APPOINTMENT (OUTPATIENT)
Dept: OBSTETRICS AND GYNECOLOGY | Facility: CLINIC | Age: 45
End: 2025-09-10
Payer: COMMERCIAL

## 2025-10-30 ENCOUNTER — APPOINTMENT (OUTPATIENT)
Dept: PRIMARY CARE | Facility: CLINIC | Age: 45
End: 2025-10-30
Payer: COMMERCIAL

## 2025-11-07 ENCOUNTER — APPOINTMENT (OUTPATIENT)
Dept: PRIMARY CARE | Facility: CLINIC | Age: 45
End: 2025-11-07
Payer: COMMERCIAL

## 2026-08-24 ENCOUNTER — APPOINTMENT (OUTPATIENT)
Dept: DERMATOLOGY | Facility: CLINIC | Age: 46
End: 2026-08-24
Payer: COMMERCIAL